# Patient Record
Sex: FEMALE | Race: BLACK OR AFRICAN AMERICAN | NOT HISPANIC OR LATINO | Employment: OTHER | RURAL
[De-identification: names, ages, dates, MRNs, and addresses within clinical notes are randomized per-mention and may not be internally consistent; named-entity substitution may affect disease eponyms.]

---

## 2017-09-18 ENCOUNTER — HISTORICAL (OUTPATIENT)
Dept: ADMINISTRATIVE | Facility: HOSPITAL | Age: 62
End: 2017-09-18

## 2017-09-20 LAB
LAB AP CLINICAL INFORMATION: NORMAL
LAB AP COMMENTS: NORMAL
LAB AP GENERAL CAT - HISTORICAL: NORMAL
LAB AP INTERPRETATION/RESULT - HISTORICAL: NEGATIVE
LAB AP SPECIMEN ADEQUACY - HISTORICAL: NORMAL
LAB AP SPECIMEN SUBMITTED - HISTORICAL: NORMAL

## 2021-11-05 ENCOUNTER — HOSPITAL ENCOUNTER (OUTPATIENT)
Dept: RADIOLOGY | Facility: HOSPITAL | Age: 66
Discharge: HOME | End: 2021-11-05
Payer: COMMERCIAL

## 2021-11-05 VITALS — BODY MASS INDEX: 32.39 KG/M2 | WEIGHT: 165 LBS | HEIGHT: 60 IN

## 2021-11-05 DIAGNOSIS — Z12.31 BREAST CANCER SCREENING BY MAMMOGRAM: ICD-10-CM

## 2021-11-05 PROCEDURE — 77067 SCR MAMMO BI INCL CAD: CPT | Mod: 26,,, | Performed by: RADIOLOGY

## 2021-11-05 PROCEDURE — 77067 MAMMO DIGITAL SCREENING BILAT: ICD-10-PCS | Mod: 26,,, | Performed by: RADIOLOGY

## 2021-11-05 PROCEDURE — 77067 SCR MAMMO BI INCL CAD: CPT | Mod: TC

## 2023-11-15 DIAGNOSIS — M10.9 GOUT FLARE: Primary | ICD-10-CM

## 2024-01-09 ENCOUNTER — OFFICE VISIT (OUTPATIENT)
Dept: FAMILY MEDICINE | Facility: CLINIC | Age: 69
End: 2024-01-09
Payer: COMMERCIAL

## 2024-01-09 VITALS
TEMPERATURE: 98 F | RESPIRATION RATE: 18 BRPM | HEART RATE: 68 BPM | DIASTOLIC BLOOD PRESSURE: 81 MMHG | WEIGHT: 154 LBS | OXYGEN SATURATION: 96 % | HEIGHT: 60 IN | BODY MASS INDEX: 30.23 KG/M2 | SYSTOLIC BLOOD PRESSURE: 130 MMHG

## 2024-01-09 DIAGNOSIS — D53.9 MACROCYTIC ANEMIA: ICD-10-CM

## 2024-01-09 DIAGNOSIS — Z12.31 ENCOUNTER FOR SCREENING MAMMOGRAM FOR MALIGNANT NEOPLASM OF BREAST: ICD-10-CM

## 2024-01-09 DIAGNOSIS — Z78.0 ASYMPTOMATIC POSTMENOPAUSAL STATE: ICD-10-CM

## 2024-01-09 DIAGNOSIS — I10 PRIMARY HYPERTENSION: ICD-10-CM

## 2024-01-09 DIAGNOSIS — F41.9 ANXIETY: ICD-10-CM

## 2024-01-09 DIAGNOSIS — M10.9 GOUT, UNSPECIFIED CAUSE, UNSPECIFIED CHRONICITY, UNSPECIFIED SITE: ICD-10-CM

## 2024-01-09 DIAGNOSIS — K21.9 GASTROESOPHAGEAL REFLUX DISEASE, UNSPECIFIED WHETHER ESOPHAGITIS PRESENT: ICD-10-CM

## 2024-01-09 DIAGNOSIS — M16.12 OSTEOARTHRITIS OF LEFT HIP, UNSPECIFIED OSTEOARTHRITIS TYPE: Primary | ICD-10-CM

## 2024-01-09 DIAGNOSIS — M25.552 LEFT HIP PAIN: ICD-10-CM

## 2024-01-09 DIAGNOSIS — E55.9 VITAMIN D DEFICIENCY: ICD-10-CM

## 2024-01-09 LAB
25(OH)D3 SERPL-MCNC: 45 NG/ML
ALBUMIN SERPL BCP-MCNC: 3.2 G/DL (ref 3.5–5)
ALBUMIN/GLOB SERPL: 0.8 {RATIO}
ALP SERPL-CCNC: 55 U/L (ref 55–142)
ALT SERPL W P-5'-P-CCNC: 14 U/L (ref 13–56)
ANION GAP SERPL CALCULATED.3IONS-SCNC: 8 MMOL/L (ref 7–16)
AST SERPL W P-5'-P-CCNC: 18 U/L (ref 15–37)
BASOPHILS # BLD AUTO: 0.02 K/UL (ref 0–0.2)
BASOPHILS NFR BLD AUTO: 0.4 % (ref 0–1)
BILIRUB SERPL-MCNC: 0.4 MG/DL (ref ?–1.2)
BUN SERPL-MCNC: 24 MG/DL (ref 7–18)
BUN/CREAT SERPL: 16 (ref 6–20)
CALCIUM SERPL-MCNC: 9.9 MG/DL (ref 8.5–10.1)
CHLORIDE SERPL-SCNC: 112 MMOL/L (ref 98–107)
CHOLEST SERPL-MCNC: 199 MG/DL (ref 0–200)
CHOLEST/HDLC SERPL: 2.8 {RATIO}
CO2 SERPL-SCNC: 26 MMOL/L (ref 21–32)
CREAT SERPL-MCNC: 1.5 MG/DL (ref 0.55–1.02)
DIFFERENTIAL METHOD BLD: ABNORMAL
EGFR (NO RACE VARIABLE) (RUSH/TITUS): 38 ML/MIN/1.73M2
EOSINOPHIL # BLD AUTO: 0.11 K/UL (ref 0–0.5)
EOSINOPHIL NFR BLD AUTO: 2 % (ref 1–4)
ERYTHROCYTE [DISTWIDTH] IN BLOOD BY AUTOMATED COUNT: 12 % (ref 11.5–14.5)
GLOBULIN SER-MCNC: 3.9 G/DL (ref 2–4)
GLUCOSE SERPL-MCNC: 86 MG/DL (ref 74–106)
HCT VFR BLD AUTO: 29.2 % (ref 38–47)
HDLC SERPL-MCNC: 71 MG/DL (ref 40–60)
HGB BLD-MCNC: 9.3 G/DL (ref 12–16)
IMM GRANULOCYTES # BLD AUTO: 0.02 K/UL (ref 0–0.04)
IMM GRANULOCYTES NFR BLD: 0.4 % (ref 0–0.4)
LDLC SERPL CALC-MCNC: 114 MG/DL
LDLC/HDLC SERPL: 1.6 {RATIO}
LYMPHOCYTES # BLD AUTO: 1.52 K/UL (ref 1–4.8)
LYMPHOCYTES NFR BLD AUTO: 27 % (ref 27–41)
MCH RBC QN AUTO: 33.2 PG (ref 27–31)
MCHC RBC AUTO-ENTMCNC: 31.8 G/DL (ref 32–36)
MCV RBC AUTO: 104.3 FL (ref 80–96)
MONOCYTES # BLD AUTO: 0.54 K/UL (ref 0–0.8)
MONOCYTES NFR BLD AUTO: 9.6 % (ref 2–6)
MPC BLD CALC-MCNC: 10 FL (ref 9.4–12.4)
NEUTROPHILS # BLD AUTO: 3.43 K/UL (ref 1.8–7.7)
NEUTROPHILS NFR BLD AUTO: 60.6 % (ref 53–65)
NONHDLC SERPL-MCNC: 128 MG/DL
NRBC # BLD AUTO: 0 X10E3/UL
NRBC, AUTO (.00): 0 %
PLATELET # BLD AUTO: 380 K/UL (ref 150–400)
POTASSIUM SERPL-SCNC: 4.4 MMOL/L (ref 3.5–5.1)
PROT SERPL-MCNC: 7.1 G/DL (ref 6.4–8.2)
RBC # BLD AUTO: 2.8 M/UL (ref 4.2–5.4)
SODIUM SERPL-SCNC: 142 MMOL/L (ref 136–145)
TRIGL SERPL-MCNC: 69 MG/DL (ref 35–150)
URATE SERPL-MCNC: 5.1 MG/DL (ref 2.6–6)
VLDLC SERPL-MCNC: 14 MG/DL
WBC # BLD AUTO: 5.64 K/UL (ref 4.5–11)

## 2024-01-09 PROCEDURE — 84550 ASSAY OF BLOOD/URIC ACID: CPT | Mod: ,,, | Performed by: CLINICAL MEDICAL LABORATORY

## 2024-01-09 PROCEDURE — 3288F FALL RISK ASSESSMENT DOCD: CPT | Mod: ,,, | Performed by: STUDENT IN AN ORGANIZED HEALTH CARE EDUCATION/TRAINING PROGRAM

## 2024-01-09 PROCEDURE — 1125F AMNT PAIN NOTED PAIN PRSNT: CPT | Mod: ,,, | Performed by: STUDENT IN AN ORGANIZED HEALTH CARE EDUCATION/TRAINING PROGRAM

## 2024-01-09 PROCEDURE — 3008F BODY MASS INDEX DOCD: CPT | Mod: ,,, | Performed by: STUDENT IN AN ORGANIZED HEALTH CARE EDUCATION/TRAINING PROGRAM

## 2024-01-09 PROCEDURE — 85025 COMPLETE CBC W/AUTO DIFF WBC: CPT | Mod: ,,, | Performed by: CLINICAL MEDICAL LABORATORY

## 2024-01-09 PROCEDURE — 1159F MED LIST DOCD IN RCRD: CPT | Mod: ,,, | Performed by: STUDENT IN AN ORGANIZED HEALTH CARE EDUCATION/TRAINING PROGRAM

## 2024-01-09 PROCEDURE — 80061 LIPID PANEL: CPT | Mod: ,,, | Performed by: CLINICAL MEDICAL LABORATORY

## 2024-01-09 PROCEDURE — 82306 VITAMIN D 25 HYDROXY: CPT | Mod: ,,, | Performed by: CLINICAL MEDICAL LABORATORY

## 2024-01-09 PROCEDURE — 3079F DIAST BP 80-89 MM HG: CPT | Mod: ,,, | Performed by: STUDENT IN AN ORGANIZED HEALTH CARE EDUCATION/TRAINING PROGRAM

## 2024-01-09 PROCEDURE — 99204 OFFICE O/P NEW MOD 45 MIN: CPT | Mod: ,,, | Performed by: STUDENT IN AN ORGANIZED HEALTH CARE EDUCATION/TRAINING PROGRAM

## 2024-01-09 PROCEDURE — 3075F SYST BP GE 130 - 139MM HG: CPT | Mod: ,,, | Performed by: STUDENT IN AN ORGANIZED HEALTH CARE EDUCATION/TRAINING PROGRAM

## 2024-01-09 PROCEDURE — 80053 COMPREHEN METABOLIC PANEL: CPT | Mod: ,,, | Performed by: CLINICAL MEDICAL LABORATORY

## 2024-01-09 PROCEDURE — 1101F PT FALLS ASSESS-DOCD LE1/YR: CPT | Mod: ,,, | Performed by: STUDENT IN AN ORGANIZED HEALTH CARE EDUCATION/TRAINING PROGRAM

## 2024-01-09 RX ORDER — FLUOXETINE HYDROCHLORIDE 20 MG/1
20 CAPSULE ORAL 2 TIMES DAILY
COMMUNITY
Start: 2023-07-25

## 2024-01-09 RX ORDER — POTASSIUM CHLORIDE 750 MG/1
10 TABLET, EXTENDED RELEASE ORAL
COMMUNITY
Start: 2023-12-12

## 2024-01-09 RX ORDER — ATENOLOL AND CHLORTHALIDONE TABLET 50; 25 MG/1; MG/1
1 TABLET ORAL
COMMUNITY
Start: 2023-11-27

## 2024-01-09 RX ORDER — CHOLECALCIFEROL (VITAMIN D3) 25 MCG
1000 TABLET ORAL DAILY
Qty: 90 TABLET | Refills: 1 | Status: SHIPPED | OUTPATIENT
Start: 2024-01-09 | End: 2025-01-08

## 2024-01-09 RX ORDER — ERGOCALCIFEROL 1.25 MG/1
50000 CAPSULE ORAL
COMMUNITY
Start: 2023-09-27

## 2024-01-09 RX ORDER — DICLOFENAC SODIUM 75 MG/1
75 TABLET, DELAYED RELEASE ORAL 2 TIMES DAILY PRN
COMMUNITY
Start: 2023-10-24

## 2024-01-09 RX ORDER — COLCHICINE 0.6 MG/1
TABLET ORAL
COMMUNITY
Start: 2023-10-31 | End: 2024-01-09

## 2024-01-09 NOTE — LETTER
January 9, 2024      [facility] [address] [place] [address]  Phone: [phone]  Fax: [phone]       Patient: Radha Waldrop   YOB: 1955  Date of Visit: 01/09/2024    To Whom It May Concern:    Lobo Waldrop  was at [facility] on 01/09/2024. The patient may return to work/school on 1/11/2024 with no restrictions. If you have any questions or concerns, or if I can be of further assistance, please do not hesitate to contact me.    Sincerely,    Nancy Ken

## 2024-01-09 NOTE — PROGRESS NOTES
Progress Note     KRISTIN SHAFFER MD   [address]  [place] [address]     PATIENT NAME: Radha Waldrop  : 1955  DATE: 24  MRN: [id number]      Billing Provider: KRISTIN SHAFFER MD  Level of Service:   Patient PCP Information       Provider PCP Type    Harris Marc MD General                Hip Pain (Pt stated she been having problems with her hip for awhile now/L hip pain/Pt rates her pain a 8 while in clinic /Pt stated she had a xray on it 2-3 years ago), Establish Care, and Health Maintenance (Pt is due for flu vaccine but stated she will get one once she get to feeling better/Pt stated she had the covid vaccine at [facility] in [place]/Pt declined the shingles vaccine/Pt wants to be schedule for a mammogram /Pt wants to be schedule for a dexa scan /Pt stated she a A1c checked 5-6 mths ago /)      SUBJECTIVE:     Radha Waldrop is a 68 y.o.female who presents to clinic for Hip Pain (Pt stated she been having problems with her hip for awhile now/L hip pain/Pt rates her pain a 8 while in clinic /Pt stated she had a xray on it 2-3 years ago), Establish Care, and Health Maintenance (Pt is due for flu vaccine but stated she will get one once she get to feeling better/Pt stated she had the covid vaccine at [facility] in [place]/Pt declined the shingles vaccine/Pt wants to be schedule for a mammogram /Pt wants to be schedule for a dexa scan /Pt stated she a A1c checked 5-6 mths ago /)    Patient presents to clinic today for establishment of care and evaluation of left hip pain.    Patient notes onset of left hip pain approximately 2-3 months ago.  Patient previously had left hip pain 2-3 years ago which was mild in nature and improved.  Patient had an x-ray at that time which showed some osteoarthritis.  Patient states that 2-3 months ago the pain presented and has since worsened.  She notes that at times it does feel a little bit better but then it returns.  She notes  worsening of pain with ambulation.  She notes improvement with rest and lying down.  Patient denies any known injuries or falls.  Patient has been taking Tylenol, diclofenac, and tramadol with some relief.  Patient has not been evaluated by orthopedics for this pain.  Patient's last imaging was 2-3 years ago.  She is currently having to walk with a cane when her hip is causing pain.    Patient previously evaluated at [facility] and Dr. Marc.  Records have been requested.    Patient has a history of gout per chart review.  She has been prescribed colchicine by [facility] in the past.  She notes that she was never had warmth, swelling, significant single joint pain in the past.  She reports history of diffuse joint pain.  Patient has been prescribed colchicine in the past but only for a single course.  She states she has not taken that in many months now.    Patient has a history of hypertension for which she takes atenolol/chlorthalidone.  She has no difficulty tolerating these medications.  Patient also takes potassium.    Patient has anxiety for which she takes Prozac.  She notes that she is not having any breakthrough symptoms.  She tolerates the medication without difficulty.    She has a history of vitamin-D deficiency and was previously on ergocalciferol but has not taken that in some months.  She has not currently taking any vitamin-D.  Has not had vitamin-D level checked in some time per her report.    Patient also has a history of GERD.  She does take an acid reflux medication but is unsure which medication she takes.  She notes symptoms are well-controlled with this regimen.    Mammogram 2 years ago. It was normal. No history of abnoraml mammograms.     Dexa scan. Last bone density 5-6 years ago. It was noraml.     Patient has a colonoscopy at [place] proximally 3 years ago and it was normal per her report.  She is not sure when she was supposed to repeat.    Still has uterus and ovaries. Was getting  regular pap smears up until age 64.  Last Pap smear was normal per review.  All future Pap smears.      All other pertinent review of systems negative. Please see HPI for details.     Past Medical History:  has a past medical history of Essential (primary) hypertension.   Past Surgical History:  has a past surgical history that includes Knee surgery (Right).  Family History: family history includes Diabetes in her mother; Lung cancer in her father.  Social History:  reports that she has never smoked. She has never used smokeless tobacco. She reports that she does not drink alcohol and does not use drugs.  Allergies:   Review of patient's allergies indicates:   Allergen Reactions    Ace inhibitors Swelling         Current Outpatient Medications:     atenoloL-chlorthalidone (TENORETIC) 50-25 mg Tab, Take 1 tablet by mouth., Disp: , Rfl:     diclofenac (VOLTAREN) 75 MG EC tablet, Take 75 mg by mouth 2 (two) times daily as needed., Disp: , Rfl:     ergocalciferol (ERGOCALCIFEROL) 50,000 unit Cap, Take 50,000 Units by mouth every 7 days., Disp: , Rfl:     FLUoxetine 20 MG capsule, Take 20 mg by mouth 2 (two) times daily., Disp: , Rfl:     potassium chloride (KLOR-CON) 10 MEQ TbSR, Take 10 mEq by mouth., Disp: , Rfl:     vitamin D (VITAMIN D3) 1000 units Tab, Take 1 tablet (1,000 Units total) by mouth once daily., Disp: 90 tablet, Rfl: 1   OBJECTIVE:     Vital Signs   /81 (BP Location: Left arm, Patient Position: Sitting, BP Method: Large (Manual))   Pulse 68   Temp 97.6 °F (36.4 °C) (Temporal)   Resp 18   Ht 5' (1.524 m)   Wt 69.9 kg (154 lb)   SpO2 96%   BMI 30.08 kg/m²     Physical Exam  Constitutional:       General: She is not in acute distress.     Appearance: Normal appearance. She is not ill-appearing, toxic-appearing or diaphoretic.   HENT:      Head: Normocephalic and atraumatic.   Eyes:      Extraocular Movements: Extraocular movements intact.      Pupils: Pupils are equal, round, and reactive to  light.   Cardiovascular:      Rate and Rhythm: Normal rate and regular rhythm.      Pulses: Normal pulses.      Heart sounds: Normal heart sounds. No murmur heard.     No friction rub. No gallop.   Pulmonary:      Effort: Pulmonary effort is normal. No respiratory distress.      Breath sounds: No wheezing, rhonchi or rales.   Abdominal:      General: Abdomen is flat.      Palpations: Abdomen is soft.      Tenderness: There is no abdominal tenderness. There is no guarding or rebound.   Musculoskeletal:         General: Normal range of motion.      Cervical back: Normal range of motion.      Comments: Positive PK and FADIR in left hip.  Decreased range of motion in left hip particularly in regards to internal rotation.  Negative on right.  No tenderness to palpation over lumbar spine or bilateral SI joints.   Skin:     General: Skin is warm and dry.      Capillary Refill: Capillary refill takes less than 2 seconds.   Neurological:      General: No focal deficit present.      Mental Status: She is alert.   Psychiatric:         Mood and Affect: Mood normal.         Behavior: Behavior normal.         ASSESSMENT/PLAN:     1. Osteoarthritis of left hip, unspecified osteoarthritis type  -     Ambulatory referral/consult to Orthopedics; Future; Expected date: 01/16/2024    2. Left hip pain  -     X-Ray Hip 2 or 3 views Left (with Pelvis when performed); Future; Expected date: 01/09/2024  -     Ambulatory referral/consult to Orthopedics; Future; Expected date: 01/16/2024    Patient with positive PK and FADIR.  X-ray of left hip obtained and shows severe osteoarthritis.  Patient may continue current medication regimen.  We will refer to orthopedics for further evaluation and treatment.  Appreciate assistance.    3. Primary hypertension  -     Lipid Panel; Future; Expected date: 01/09/2024  -     Comprehensive Metabolic Panel; Future; Expected date: 01/09/2024  -     CBC Auto Differential; Future; Expected date:  01/09/2024    Patient with hypertension currently well-controlled with atenolol/chlorthalidone.  Continue current regimen.  We will obtain lipid panel, CMP, and CBC for routine monitoring.    4. Gout, unspecified cause, unspecified chronicity, unspecified site  -     Uric Acid; Future; Expected date: 01/09/2024    Patient with questionable history of gout.  We will obtain uric acid level.    5. Anxiety    Patient with anxiety and is currently well-controlled with Prozac.  Continue Prozac.    6. Vitamin D deficiency  -     Vitamin D; Future; Expected date: 01/09/2024    Patient with a history of vitamin-D deficiency for which he was previously prescribed ergocalciferol.  We will prescribe daily cholecalciferol.  Pending vitamin-D results, patient may benefit from additional course of ergocalciferol times 12 weeks.    7. Gastroesophageal reflux disease, unspecified whether esophagitis present    Patient with a history of GERD for which she is taking an unknown antacid.    8. Asymptomatic postmenopausal state  -     DXA Bone Density Axial Skeleton 1 or more sites; Future; Expected date: 01/09/2024    DEXA scan ordered.    9. Encounter for screening mammogram for malignant neoplasm of breast  -     Mammo Digital Screening Bilat; Future; Expected date: 01/09/2024    Mammogram ordered.    Other orders  -     vitamin D (VITAMIN D3) 1000 units Tab; Take 1 tablet (1,000 Units total) by mouth once daily.  Dispense: 90 tablet; Refill: 1        No follow-ups on file.      KRISTIN SHAFFER MD  01/09/2024    Due to voice recognition software, sound alike and misspelled words may be contained in the documentation.

## 2024-01-10 ENCOUNTER — PATIENT OUTREACH (OUTPATIENT)
Dept: ADMINISTRATIVE | Facility: HOSPITAL | Age: 69
End: 2024-01-10

## 2024-01-10 DIAGNOSIS — D53.9 MACROCYTIC ANEMIA: Primary | ICD-10-CM

## 2024-01-10 LAB
FERRITIN SERPL-MCNC: 164 NG/ML (ref 8–252)
FOLATE SERPL-MCNC: 7 NG/ML (ref 3.1–17.5)
IRON SATN MFR SERPL: 31 % (ref 14–50)
IRON SERPL-MCNC: 83 ΜG/DL (ref 50–170)
TIBC SERPL-MCNC: 264 ΜG/DL (ref 250–450)
VIT B12 SERPL-MCNC: 673 PG/ML (ref 193–986)

## 2024-01-10 PROCEDURE — 82728 ASSAY OF FERRITIN: CPT | Mod: ,,, | Performed by: CLINICAL MEDICAL LABORATORY

## 2024-01-10 PROCEDURE — 83550 IRON BINDING TEST: CPT | Mod: ,,, | Performed by: CLINICAL MEDICAL LABORATORY

## 2024-01-10 PROCEDURE — 82746 ASSAY OF FOLIC ACID SERUM: CPT | Mod: ,,, | Performed by: CLINICAL MEDICAL LABORATORY

## 2024-01-10 PROCEDURE — 83540 ASSAY OF IRON: CPT | Mod: ,,, | Performed by: CLINICAL MEDICAL LABORATORY

## 2024-01-10 PROCEDURE — 82607 VITAMIN B-12: CPT | Mod: ,,, | Performed by: CLINICAL MEDICAL LABORATORY

## 2024-01-10 NOTE — PROGRESS NOTES
Please let patient know that her cholesterol is well-controlled.  Patient's liver enzymes and electrolytes are normal.  She does have elevated creatinine.  She will need make sure she is drinking plenty of water and avoiding NSAIDs such as Aleve and Motrin and ibuprofen.  She may take Tylenol for pain control.  She will need to stop her Voltaren tablets.  We will need to recheck this level in 3 months to ensure it is improving.  Patient also has some anemia.  If amenable, I would like to add on additional blood work for further evaluation.

## 2024-01-10 NOTE — LETTER
AUTHORIZATION FOR RELEASE OF   CONFIDENTIAL INFORMATION    Dear [facility],    We are seeing aRdha Waldrop, date of birth 1955, in the clinic at [facility]. Mireya Barnes MD is the patient's PCP. Radha Waldrop has an outstanding lab/procedure at the time we reviewed her chart. In order to help keep her health information updated, she has authorized us to request the following medical record(s):        (  )  MAMMOGRAM                                      (X)  COLONOSCOPY      (  )  PAP SMEAR                                          (  )  OUTSIDE LAB RESULTS     (  )  DEXA SCAN                                          (  )  EYE EXAM            (  )  FOOT EXAM                                          (  )  ENTIRE RECORD     (  )  OUTSIDE IMMUNIZATIONS                 (  )  _______________         Please fax records to [facility], Geena Presley, [phone].     If you have any questions, please contact [phone].          Patient Name: Radha Waldrop  : 1955  Patient Phone #: [phone]

## 2024-01-11 NOTE — PROGRESS NOTES
Please let patient know that she has no signs of iron-deficiency anemia or vitamin B12 deficiency associated anemia.  Please ensure patient has no signs of active bleeding.  We will need to recheck this blood count in 1 month to ensure it is trending up.  If it is not, we will need to perform additional workup.  Please have patient follow up in 1 month.

## 2024-01-12 DIAGNOSIS — N28.9 DISORDER OF KIDNEY AND URETER: Primary | ICD-10-CM

## 2024-01-17 ENCOUNTER — PATIENT OUTREACH (OUTPATIENT)
Dept: ADMINISTRATIVE | Facility: HOSPITAL | Age: 69
End: 2024-01-17

## 2024-01-17 NOTE — PROGRESS NOTES
Requested colonoscopy from [facility] , and no records was found. Will let Cameron know that pt needs a referral to have one completed to be compliant.

## 2024-01-19 ENCOUNTER — TELEPHONE (OUTPATIENT)
Dept: NEPHROLOGY | Facility: CLINIC | Age: 69
End: 2024-01-19
Payer: COMMERCIAL

## 2024-01-19 NOTE — TELEPHONE ENCOUNTER
----- Message from Dina Bean sent at 1/19/2024  9:42 AM CST -----  Regarding: Referral  Oswaldo Palomino NP refer pt for  N28.9 (ICD-10-CM) - Disorder of kidney and ureter. Please call her @ [phone]

## 2024-01-22 ENCOUNTER — OFFICE VISIT (OUTPATIENT)
Dept: ORTHOPEDICS | Facility: CLINIC | Age: 69
End: 2024-01-22
Payer: COMMERCIAL

## 2024-01-22 DIAGNOSIS — M16.12 OSTEOARTHRITIS OF LEFT HIP, UNSPECIFIED OSTEOARTHRITIS TYPE: Primary | ICD-10-CM

## 2024-01-22 DIAGNOSIS — M25.552 LEFT HIP PAIN: ICD-10-CM

## 2024-01-22 PROCEDURE — 99204 OFFICE O/P NEW MOD 45 MIN: CPT | Mod: S$PBB,,, | Performed by: ORTHOPAEDIC SURGERY

## 2024-01-22 PROCEDURE — 1160F RVW MEDS BY RX/DR IN RCRD: CPT | Mod: CPTII,,, | Performed by: ORTHOPAEDIC SURGERY

## 2024-01-22 PROCEDURE — 1159F MED LIST DOCD IN RCRD: CPT | Mod: CPTII,,, | Performed by: ORTHOPAEDIC SURGERY

## 2024-01-22 PROCEDURE — 99213 OFFICE O/P EST LOW 20 MIN: CPT | Mod: PBBFAC | Performed by: ORTHOPAEDIC SURGERY

## 2024-01-22 RX ORDER — MELOXICAM 15 MG/1
15 TABLET ORAL DAILY
Qty: 30 TABLET | Refills: 2 | Status: ON HOLD | OUTPATIENT
Start: 2024-01-22 | End: 2024-03-15 | Stop reason: HOSPADM

## 2024-01-22 NOTE — PROGRESS NOTES
CLINIC NOTE       Chief Complaint   Patient presents with    Left Hip - Pain        Radha Waldrop is a 68 y.o. female seen today for evaluation of left hip pain.  Symptoms began insidiously been escalating over the past 3-4 months period of time.  She has been experiencing groin and thigh pain worse with weight-bearing.  She was taken Tylenol for pain control.  She was not on any anti-inflammatory medications nor does she take any blood thinners.  She has not had a corticosteroid injection in her hip.  She has undergone right TKR performed elsewhere.  She lives alone and [place].  Her  is .    X-rays of the pelvis and both hips 2022 (outside films) two views AP and lateral projection were reviewed.  There is severe DJD of the left hip joint with obliteration of the joint space and marginal osteophyte formation.  There is moderate narrowing of the right hip joint space.  Past Medical History:   Diagnosis Date    Essential (primary) hypertension      Family History   Problem Relation Age of Onset    Diabetes Mother     Lung cancer Father      Current Outpatient Medications on File Prior to Visit   Medication Sig Dispense Refill    atenoloL-chlorthalidone (TENORETIC) 50-25 mg Tab Take 1 tablet by mouth.      diclofenac (VOLTAREN) 75 MG EC tablet Take 75 mg by mouth 2 (two) times daily as needed.      ergocalciferol (ERGOCALCIFEROL) 50,000 unit Cap Take 50,000 Units by mouth every 7 days.      FLUoxetine 20 MG capsule Take 20 mg by mouth 2 (two) times daily.      potassium chloride (KLOR-CON) 10 MEQ TbSR Take 10 mEq by mouth.      vitamin D (VITAMIN D3) 1000 units Tab Take 1 tablet (1,000 Units total) by mouth once daily. 90 tablet 1     No current facility-administered medications on file prior to visit.       ROS     There were no vitals filed for this visit.    Past Surgical History:   Procedure Laterality Date    KNEE SURGERY Right         Review of patient's allergies  indicates:   Allergen Reactions    Ace inhibitors Swelling        Ortho Exam :  Well-developed well-nourished black female no acute distress.  She is alert oriented cooperative.  She was presented wheelchair but is able to ambulate.  She does reportedly have a significant limp and her symptoms disrupt desired activities of daily living.  There is component of night pain.  There is restriction of joint motion in all planes.  There is a lack of internal rotation beyond the neutral position.  Hip flexion and internal rotation causes significant pain.    Radiographic Examination:    Technique:    Findings:    Impression:   See Above    Assessment and Plan  Patient Active Problem List    Diagnosis Date Noted    Primary hypertension 01/09/2024    Gout 01/09/2024    Anxiety 01/09/2024    Vitamin D deficiency 01/09/2024    Gastroesophageal reflux disease 01/09/2024    Impression:  Severe DJD left hip   Plan:  We discussed conservative and surgical management options.  She was offered Mobic 15 mg 1 p.o. q.d. to be used on a p.r.n. basis.  She can consider intra-articular steroid injection left [facility] at [facility] but we will need to wait 3-4 months after injection before pursuing THR.  We discussed Arnold robotic assisted left THR with overnight stay in the hospital.  Potential benefits risks surgery outlined to include but not limited to bleeding, infection, damage to blood vessels nerves, need for further surgery, other risks complications including even death.  She was given numbers to call the office if she does not pursue cortisone injection and wished to proceed.  Rx M[facility] and      Apolinar Blanco M.D.

## 2024-01-26 ENCOUNTER — TELEPHONE (OUTPATIENT)
Dept: ORTHOPEDICS | Facility: CLINIC | Age: 69
End: 2024-01-26
Payer: COMMERCIAL

## 2024-02-01 ENCOUNTER — TELEPHONE (OUTPATIENT)
Dept: ORTHOPEDICS | Facility: CLINIC | Age: 69
End: 2024-02-01
Payer: COMMERCIAL

## 2024-02-01 NOTE — TELEPHONE ENCOUNTER
----- Message from Kristina Mills sent at 2/1/2024 11:43 AM CST -----  Pt f/u from message she left on 1/26 regarding questions about surgery that was discussed at recent visit - pt call back # [phone]

## 2024-02-09 DIAGNOSIS — Z71.89 COMPLEX CARE COORDINATION: ICD-10-CM

## 2024-02-13 DIAGNOSIS — Z01.818 PREOP EXAMINATION: ICD-10-CM

## 2024-02-13 DIAGNOSIS — Z01.812 PRE-PROCEDURE LAB EXAM: ICD-10-CM

## 2024-02-13 DIAGNOSIS — Z01.810 PREOP CARDIOVASCULAR EXAM: ICD-10-CM

## 2024-02-13 DIAGNOSIS — Z01.811 PREOP RESPIRATORY EXAM: ICD-10-CM

## 2024-02-13 DIAGNOSIS — M16.12 OSTEOARTHRITIS OF LEFT HIP, UNSPECIFIED OSTEOARTHRITIS TYPE: Primary | ICD-10-CM

## 2024-02-21 ENCOUNTER — TELEPHONE (OUTPATIENT)
Dept: ORTHOPEDICS | Facility: CLINIC | Age: 69
End: 2024-02-21
Payer: COMMERCIAL

## 2024-02-21 NOTE — TELEPHONE ENCOUNTER
----- Message from Dipika Richardson sent at 2/21/2024  9:55 AM CST -----  Regarding: JOINT CLASS  PATIENT WOULD LIKE TO SPEAK WITH NURSE ABOUT THE JOINT CLASS SHE IS SCHEDULED FOR TODAY.  CALL BACK NUMBER IS [phone].

## 2024-02-21 NOTE — TELEPHONE ENCOUNTER
2/21/24 @ 1011 called and spoke with pt. Pt states she will not be able to make it to joint class today. Next joint class is march 13 pt will try and attend that one. Number given to  for pt to talk to them about what she might need after her hip surgery

## 2024-02-26 NOTE — PROGRESS NOTES
Progress Note     KRISTIN SHAFFER MD   [address]  [place] [address]     PATIENT NAME: Radha Waldrop  : 1955  DATE: 24  MRN: [id number]      Billing Provider: KRISTIN SHAFFER MD  Level of Service:   Patient PCP Information       Provider PCP Type    KRISTIN SHAFFER MD General                Follow-up (Patient is here today for follow up for surgery that's coming up.) and Health Maintenance (Patient is interested in getting the flu vaccine and pheneumoccal vaccine. Pateint declined all other care gaps.)      SUBJECTIVE:     Radha Waldrop is a 68 y.o.female who presents to clinic for Follow-up (Patient is here today for follow up for surgery that's coming up.) and Health Maintenance (Patient is interested in getting the flu vaccine and pheneumoccal vaccine. Pateint declined all other care gaps.)    Patient presents to clinic today for preop exam.    Patient was scheduled to have hip replacement secondary to severe osteoarthritis.  Patient has had anesthesia in the past without adverse side effects.  Patient denies any baseline chest pain, shortness for breath, or lightheadedness/dizziness with activity.  Patient states prior to onset of her significant hip pain she was able to go to the gym daily and exercise consistently without shortness a breath or chest pain.  Patient is able to walk up a flight of stairs without shortness a breath.  Patient without any known history of heart disease.  Patient has been no history of diabetes, lung disease, or tobacco use.  Patient does have hypertension but blood pressure.  Blood pressure was initially elevated but improved with repeat.    Patient does have longstanding history of anemia and did require a blood transfusion following knee replacement some years ago.  Patient notes significant family history of anemia as well.  Patient states that she was had anemia since she was a child.  Recent workup did not show any signs  of iron or vitamin B12/folate deficiency.  Patient reports normal colonoscopy 3-4 years ago at [facility].  Records were requested at her establishment of care appointment in January.    Patient is currently taking Mobic for pain control as well as aspirin for primary prevention.  Patient understands she was supposed to hold Mobic at least two weeks prior to surgery as well as her aspirin.    Patient is also requesting medication refills.  Patient has a history of vitamin-D deficiency.  She tolerates vitamin-D without difficulty.  Patient has a history of hypertension for which he takes atenolol/chlorthalidone.  Patient also has a history of anxiety for which she takes Prozac, topiramate, and trazodone to assist with sleep.  She tolerates these medications without difficulty and notes her symptoms are well-controlled.  Patient also has a history of GERD for which he takes Prilosec.  Patient denies any breakthrough symptoms when taking this medication appropriately.    All other pertinent review of systems negative. Please see HPI for details.     Past Medical History:  has a past medical history of Essential (primary) hypertension.   Past Surgical History:  has a past surgical history that includes Knee surgery (Right).  Family History: family history includes Diabetes in her mother; Lung cancer in her father.  Social History:  reports that she has never smoked. She has never been exposed to tobacco smoke. She has never used smokeless tobacco. She reports that she does not drink alcohol and does not use drugs.  Allergies:   Review of patient's allergies indicates:   Allergen Reactions    Ace inhibitors Swelling         Current Outpatient Medications:     acetaminophen (TYLENOL) 650 MG TbSR, Take 650 mg by mouth every 8 (eight) hours., Disp: , Rfl:     aspirin (ECOTRIN) 81 MG EC tablet, Take 81 mg by mouth once daily., Disp: , Rfl:     meloxicam (MOBIC) 15 MG tablet, Take 1 tablet (15 mg total) by mouth once daily.,  Disp: 30 tablet, Rfl: 2    atenoloL-chlorthalidone (TENORETIC) 50-25 mg Tab, Take 1 tablet by mouth once daily., Disp: 90 tablet, Rfl: 1    FLUoxetine 20 MG capsule, Take 1 capsule (20 mg total) by mouth 2 (two) times daily., Disp: 180 capsule, Rfl: 1    omeprazole (PRILOSEC) 20 MG capsule, Take 1 capsule (20 mg total) by mouth once daily., Disp: 90 capsule, Rfl: 1    potassium chloride (KLOR-CON) 10 MEQ TbSR, Take 1 tablet (10 mEq total) by mouth once daily., Disp: 90 tablet, Rfl: 1    topiramate (TOPAMAX) 25 MG tablet, Take 1 tablet (25 mg total) by mouth 2 (two) times daily., Disp: 180 tablet, Rfl: 1    traZODone (DESYREL) 50 MG tablet, Take 1 tablet (50 mg total) by mouth every evening., Disp: 90 tablet, Rfl: 1    vitamin D (VITAMIN D3) 1000 units Tab, Take 1 tablet (1,000 Units total) by mouth once daily., Disp: 90 tablet, Rfl: 1   OBJECTIVE:     Vital Signs   /84 Comment: Manual on 3/1  Pulse 60   Temp 97.6 °F (36.4 °C) (Temporal)   Resp 19   Ht 5' (1.524 m)   Wt 70.8 kg (156 lb)   SpO2 99%   BMI 30.47 kg/m²     Physical Exam  Constitutional:       General: She is not in acute distress.     Appearance: Normal appearance. She is not ill-appearing, toxic-appearing or diaphoretic.   HENT:      Head: Normocephalic and atraumatic.      Right Ear: Tympanic membrane normal.      Left Ear: Tympanic membrane normal.      Nose: No congestion or rhinorrhea.      Mouth/Throat:      Mouth: Mucous membranes are moist.      Pharynx: No oropharyngeal exudate or posterior oropharyngeal erythema.   Eyes:      Extraocular Movements: Extraocular movements intact.      Pupils: Pupils are equal, round, and reactive to light.   Cardiovascular:      Rate and Rhythm: Normal rate and regular rhythm.      Pulses: Normal pulses.      Heart sounds: Normal heart sounds. No murmur heard.     No friction rub. No gallop.   Pulmonary:      Effort: Pulmonary effort is normal. No respiratory distress.      Breath sounds: No  wheezing, rhonchi or rales.   Abdominal:      General: Abdomen is flat.      Palpations: Abdomen is soft.      Tenderness: There is no abdominal tenderness. There is no guarding or rebound.   Musculoskeletal:         General: Normal range of motion.      Cervical back: Normal range of motion and neck supple. No rigidity or tenderness.      Right lower leg: No edema.      Left lower leg: No edema.   Lymphadenopathy:      Cervical: No cervical adenopathy.   Skin:     General: Skin is warm and dry.      Capillary Refill: Capillary refill takes less than 2 seconds.   Neurological:      General: No focal deficit present.      Mental Status: She is alert.   Psychiatric:         Mood and Affect: Mood normal.         Behavior: Behavior normal.     ECG: Sinus rhythm, intraatrial conduction delay    ASSESSMENT/PLAN:     1. Preop examination  -     IN OFFICE EKG 12-LEAD (to [facility])  -     Cancel: CBC Auto Differential; Future; Expected date: 02/27/2024  -     Cancel: Basic Metabolic Panel; Future; Expected date: 02/27/2024  -     POCT URINALYSIS W/O SCOPE  -     Cancel: Protime-INR; Future; Expected date: 02/27/2024  -     Cancel: APTT; Future; Expected date: 02/27/2024  -     X-Ray Chest PA And Lateral; Future; Expected date: 02/27/2024  -     APTT  -     Basic Metabolic Panel  -     CBC Auto Differential  -     Protime-INR  -     Urinalysis  -     Type & Screen  -     CBC Morphology    2. Pre-procedure lab exam  -     APTT  -     Basic Metabolic Panel  -     CBC Auto Differential  -     Protime-INR  -     Urinalysis  -     Type & Screen  -     CBC Morphology    Patient with met score of  >4.  ECG and chest x-ray without signs of ischemic heart disease or congestive heart failure.  Renal function improved from previous.  Patient with baseline anemia but improved from previous.  Patient's anemia is chronic since childhood.  Patient has required blood following procedures in the past.  Type and screen was obtained.  Patient's  revised cardiac risk index score 0.  Patient has a class 1 risk with a 3.9% chance of 30 day risk of death, MI, or cardiac arrest following surgery.  Discussed risks and benefits of surgery from a medical standpoint.  Patient verbalized understanding.  Patient to hold Mobic and aspirin.  Patient cleared for surgery from medical standpoint.    3. Vitamin D deficiency  -     vitamin D (VITAMIN D3) 1000 units Tab; Take 1 tablet (1,000 Units total) by mouth once daily.  Dispense: 90 tablet; Refill: 1  Refill provided.    4. Anxiety  -     FLUoxetine 20 MG capsule; Take 1 capsule (20 mg total) by mouth 2 (two) times daily.  Dispense: 180 capsule; Refill: 1  -     topiramate (TOPAMAX) 25 MG tablet; Take 1 tablet (25 mg total) by mouth 2 (two) times daily.  Dispense: 180 tablet; Refill: 1  -     traZODone (DESYREL) 50 MG tablet; Take 1 tablet (50 mg total) by mouth every evening.  Dispense: 90 tablet; Refill: 1  Currently well-controlled.  Refill provided per patient request.    5. Gastroesophageal reflux disease, unspecified whether esophagitis present  -     omeprazole (PRILOSEC) 20 MG capsule; Take 1 capsule (20 mg total) by mouth once daily.  Dispense: 90 capsule; Refill: 1  Symptoms well-controlled.  Refill provided.    6. Primary hypertension  -     atenoloL-chlorthalidone (TENORETIC) 50-25 mg Tab; Take 1 tablet by mouth once daily.  Dispense: 90 tablet; Refill: 1  -     potassium chloride (KLOR-CON) 10 MEQ TbSR; Take 1 tablet (10 mEq total) by mouth once daily.  Dispense: 90 tablet; Refill: 1  Blood pressure well-controlled.  Continue current regimen.  Refill provided.      Follow up in about 3 months (around 5/27/2024) for Recheck .      KRISTIN SHAFFER MD  03/07/2024    Due to voice recognition software, sound alike and misspelled words may be contained in the documentation.

## 2024-02-27 ENCOUNTER — OFFICE VISIT (OUTPATIENT)
Dept: FAMILY MEDICINE | Facility: CLINIC | Age: 69
End: 2024-02-27
Payer: COMMERCIAL

## 2024-02-27 DIAGNOSIS — F41.9 ANXIETY: ICD-10-CM

## 2024-02-27 DIAGNOSIS — Z01.812 PRE-PROCEDURE LAB EXAM: ICD-10-CM

## 2024-02-27 DIAGNOSIS — Z01.818 PREOP EXAMINATION: Primary | ICD-10-CM

## 2024-02-27 DIAGNOSIS — E55.9 VITAMIN D DEFICIENCY: ICD-10-CM

## 2024-02-27 DIAGNOSIS — K21.9 GASTROESOPHAGEAL REFLUX DISEASE, UNSPECIFIED WHETHER ESOPHAGITIS PRESENT: ICD-10-CM

## 2024-02-27 DIAGNOSIS — I10 PRIMARY HYPERTENSION: ICD-10-CM

## 2024-02-27 LAB
ANION GAP SERPL CALCULATED.3IONS-SCNC: 10 MMOL/L (ref 7–16)
APTT PPP: 33.2 SECONDS (ref 25.2–37.3)
BASOPHILS # BLD AUTO: 0.02 K/UL (ref 0–0.2)
BASOPHILS NFR BLD AUTO: 0.4 % (ref 0–1)
BILIRUB SERPL-MCNC: NEGATIVE MG/DL
BILIRUB UR QL STRIP: NEGATIVE
BLOOD URINE, POC: NEGATIVE
BUN SERPL-MCNC: 30 MG/DL (ref 7–18)
BUN/CREAT SERPL: 24 (ref 6–20)
CALCIUM SERPL-MCNC: 9.5 MG/DL (ref 8.5–10.1)
CHLORIDE SERPL-SCNC: 110 MMOL/L (ref 98–107)
CLARITY UR: CLEAR
CO2 SERPL-SCNC: 24 MMOL/L (ref 21–32)
COLOR UR: NORMAL
COLOR, POC UA: YELLOW
CREAT SERPL-MCNC: 1.23 MG/DL (ref 0.55–1.02)
DIFFERENTIAL METHOD BLD: ABNORMAL
EGFR (NO RACE VARIABLE) (RUSH/TITUS): 48 ML/MIN/1.73M2
EOSINOPHIL # BLD AUTO: 0.21 K/UL (ref 0–0.5)
EOSINOPHIL NFR BLD AUTO: 4.4 % (ref 1–4)
ERYTHROCYTE [DISTWIDTH] IN BLOOD BY AUTOMATED COUNT: 11.8 % (ref 11.5–14.5)
GLUCOSE SERPL-MCNC: 75 MG/DL (ref 74–106)
GLUCOSE UR QL STRIP: NEGATIVE
GLUCOSE UR STRIP-MCNC: NORMAL MG/DL
HCT VFR BLD AUTO: 33.4 % (ref 38–47)
HGB BLD-MCNC: 10.7 G/DL (ref 12–16)
IMM GRANULOCYTES # BLD AUTO: 0.01 K/UL (ref 0–0.04)
IMM GRANULOCYTES NFR BLD: 0.2 % (ref 0–0.4)
INR BLD: 1.13
KETONES UR QL STRIP: NEGATIVE
KETONES UR STRIP-SCNC: NEGATIVE MG/DL
LEUKOCYTE ESTERASE UR QL STRIP: NEGATIVE
LEUKOCYTE ESTERASE URINE, POC: NEGATIVE
LYMPHOCYTES # BLD AUTO: 1.31 K/UL (ref 1–4.8)
LYMPHOCYTES NFR BLD AUTO: 27.7 % (ref 27–41)
MACROCYTES BLD QL SMEAR: NORMAL
MCH RBC QN AUTO: 33.8 PG (ref 27–31)
MCHC RBC AUTO-ENTMCNC: 32 G/DL (ref 32–36)
MCV RBC AUTO: 105.4 FL (ref 80–96)
MONOCYTES # BLD AUTO: 0.4 K/UL (ref 0–0.8)
MONOCYTES NFR BLD AUTO: 8.5 % (ref 2–6)
MPC BLD CALC-MCNC: 10.3 FL (ref 9.4–12.4)
NEUTROPHILS # BLD AUTO: 2.78 K/UL (ref 1.8–7.7)
NEUTROPHILS NFR BLD AUTO: 58.8 % (ref 53–65)
NITRITE UR QL STRIP: NEGATIVE
NITRITE, POC UA: NEGATIVE
NRBC # BLD AUTO: 0 X10E3/UL
NRBC, AUTO (.00): 0 %
PH UR STRIP: 5.5 PH UNITS
PH, POC UA: 6
PLATELET # BLD AUTO: 291 K/UL (ref 150–400)
PLATELET MORPHOLOGY: NORMAL
POTASSIUM SERPL-SCNC: 3.8 MMOL/L (ref 3.5–5.1)
PROT UR QL STRIP: NEGATIVE
PROTEIN, POC: NEGATIVE
PROTHROMBIN TIME: 14.4 SECONDS (ref 11.7–14.7)
RBC # BLD AUTO: 3.17 M/UL (ref 4.2–5.4)
RBC # UR STRIP: NEGATIVE /UL
SODIUM SERPL-SCNC: 140 MMOL/L (ref 136–145)
SP GR UR STRIP: 1.02
SPECIFIC GRAVITY, POC UA: 1.02
UROBILINOGEN UR STRIP-ACNC: NORMAL MG/DL
UROBILINOGEN, POC UA: 0.2
WBC # BLD AUTO: 4.73 K/UL (ref 4.5–11)

## 2024-02-27 PROCEDURE — 3288F FALL RISK ASSESSMENT DOCD: CPT | Mod: ,,, | Performed by: STUDENT IN AN ORGANIZED HEALTH CARE EDUCATION/TRAINING PROGRAM

## 2024-02-27 PROCEDURE — 1101F PT FALLS ASSESS-DOCD LE1/YR: CPT | Mod: ,,, | Performed by: STUDENT IN AN ORGANIZED HEALTH CARE EDUCATION/TRAINING PROGRAM

## 2024-02-27 PROCEDURE — 99214 OFFICE O/P EST MOD 30 MIN: CPT | Mod: ,,, | Performed by: STUDENT IN AN ORGANIZED HEALTH CARE EDUCATION/TRAINING PROGRAM

## 2024-02-27 PROCEDURE — 3075F SYST BP GE 130 - 139MM HG: CPT | Mod: ,,, | Performed by: STUDENT IN AN ORGANIZED HEALTH CARE EDUCATION/TRAINING PROGRAM

## 2024-02-27 PROCEDURE — 81003 URINALYSIS AUTO W/O SCOPE: CPT | Mod: QW,,, | Performed by: CLINICAL MEDICAL LABORATORY

## 2024-02-27 PROCEDURE — 3079F DIAST BP 80-89 MM HG: CPT | Mod: ,,, | Performed by: STUDENT IN AN ORGANIZED HEALTH CARE EDUCATION/TRAINING PROGRAM

## 2024-02-27 PROCEDURE — 3008F BODY MASS INDEX DOCD: CPT | Mod: ,,, | Performed by: STUDENT IN AN ORGANIZED HEALTH CARE EDUCATION/TRAINING PROGRAM

## 2024-02-27 PROCEDURE — 80048 BASIC METABOLIC PNL TOTAL CA: CPT | Mod: ,,, | Performed by: CLINICAL MEDICAL LABORATORY

## 2024-02-27 PROCEDURE — 93010 ELECTROCARDIOGRAM REPORT: CPT | Mod: ,,, | Performed by: STUDENT IN AN ORGANIZED HEALTH CARE EDUCATION/TRAINING PROGRAM

## 2024-02-27 PROCEDURE — 1126F AMNT PAIN NOTED NONE PRSNT: CPT | Mod: ,,, | Performed by: STUDENT IN AN ORGANIZED HEALTH CARE EDUCATION/TRAINING PROGRAM

## 2024-02-27 PROCEDURE — 93005 ELECTROCARDIOGRAM TRACING: CPT | Mod: ,,, | Performed by: STUDENT IN AN ORGANIZED HEALTH CARE EDUCATION/TRAINING PROGRAM

## 2024-02-27 PROCEDURE — 81003 URINALYSIS AUTO W/O SCOPE: CPT | Mod: QW,,, | Performed by: STUDENT IN AN ORGANIZED HEALTH CARE EDUCATION/TRAINING PROGRAM

## 2024-02-27 PROCEDURE — 85025 COMPLETE CBC W/AUTO DIFF WBC: CPT | Mod: ,,, | Performed by: CLINICAL MEDICAL LABORATORY

## 2024-02-27 RX ORDER — TOPIRAMATE 25 MG/1
25 TABLET ORAL 2 TIMES DAILY
COMMUNITY
End: 2024-03-01 | Stop reason: SDUPTHER

## 2024-02-27 RX ORDER — TRAZODONE HYDROCHLORIDE 50 MG/1
50 TABLET ORAL NIGHTLY
COMMUNITY
End: 2024-03-01 | Stop reason: SDUPTHER

## 2024-02-27 RX ORDER — ASPIRIN 81 MG/1
81 TABLET ORAL DAILY
Status: ON HOLD | COMMUNITY
End: 2024-03-15 | Stop reason: HOSPADM

## 2024-02-27 RX ORDER — DEXTROMETHORPHAN HYDROBROMIDE, GUAIFENESIN 5; 100 MG/5ML; MG/5ML
650 LIQUID ORAL EVERY 8 HOURS
Status: ON HOLD | COMMUNITY
End: 2024-03-15 | Stop reason: HOSPADM

## 2024-02-27 RX ORDER — OMEPRAZOLE 20 MG/1
20 CAPSULE, DELAYED RELEASE ORAL
COMMUNITY
Start: 2023-11-27 | End: 2024-03-01 | Stop reason: SDUPTHER

## 2024-03-01 VITALS
OXYGEN SATURATION: 99 % | SYSTOLIC BLOOD PRESSURE: 136 MMHG | DIASTOLIC BLOOD PRESSURE: 84 MMHG | BODY MASS INDEX: 30.63 KG/M2 | WEIGHT: 156 LBS | HEIGHT: 60 IN | TEMPERATURE: 98 F | HEART RATE: 60 BPM | RESPIRATION RATE: 19 BRPM

## 2024-03-01 RX ORDER — ATENOLOL AND CHLORTHALIDONE TABLET 50; 25 MG/1; MG/1
1 TABLET ORAL DAILY
Qty: 90 TABLET | Refills: 1 | Status: SHIPPED | OUTPATIENT
Start: 2024-03-01

## 2024-03-01 RX ORDER — OMEPRAZOLE 20 MG/1
20 CAPSULE, DELAYED RELEASE ORAL DAILY
Qty: 90 CAPSULE | Refills: 1 | Status: SHIPPED | OUTPATIENT
Start: 2024-03-01

## 2024-03-01 RX ORDER — TRAZODONE HYDROCHLORIDE 50 MG/1
50 TABLET ORAL NIGHTLY
Qty: 90 TABLET | Refills: 1 | Status: SHIPPED | OUTPATIENT
Start: 2024-03-01

## 2024-03-01 RX ORDER — TOPIRAMATE 25 MG/1
25 TABLET ORAL 2 TIMES DAILY
Qty: 180 TABLET | Refills: 1 | Status: SHIPPED | OUTPATIENT
Start: 2024-03-01

## 2024-03-01 RX ORDER — POTASSIUM CHLORIDE 750 MG/1
10 TABLET, EXTENDED RELEASE ORAL DAILY
Qty: 90 TABLET | Refills: 1 | Status: SHIPPED | OUTPATIENT
Start: 2024-03-01

## 2024-03-01 RX ORDER — FLUOXETINE HYDROCHLORIDE 20 MG/1
20 CAPSULE ORAL 2 TIMES DAILY
Qty: 180 CAPSULE | Refills: 1 | Status: SHIPPED | OUTPATIENT
Start: 2024-03-01

## 2024-03-01 RX ORDER — CHOLECALCIFEROL (VITAMIN D3) 25 MCG
1000 TABLET ORAL DAILY
Qty: 90 TABLET | Refills: 1 | Status: SHIPPED | OUTPATIENT
Start: 2024-03-01 | End: 2025-03-01

## 2024-03-07 ENCOUNTER — OFFICE VISIT (OUTPATIENT)
Dept: ORTHOPEDICS | Facility: CLINIC | Age: 69
End: 2024-03-07
Payer: COMMERCIAL

## 2024-03-07 ENCOUNTER — HOSPITAL ENCOUNTER (OUTPATIENT)
Dept: RADIOLOGY | Facility: HOSPITAL | Age: 69
Discharge: HOME | End: 2024-03-07
Attending: ORTHOPAEDIC SURGERY
Payer: COMMERCIAL

## 2024-03-07 VITALS — HEIGHT: 60 IN | WEIGHT: 156 LBS | BODY MASS INDEX: 30.63 KG/M2

## 2024-03-07 DIAGNOSIS — M16.12 OSTEOARTHRITIS OF LEFT HIP, UNSPECIFIED OSTEOARTHRITIS TYPE: ICD-10-CM

## 2024-03-07 DIAGNOSIS — M16.12 PRIMARY OSTEOARTHRITIS OF LEFT HIP: Primary | ICD-10-CM

## 2024-03-07 PROCEDURE — 99213 OFFICE O/P EST LOW 20 MIN: CPT | Mod: S$PBB,,,

## 2024-03-07 PROCEDURE — 99213 OFFICE O/P EST LOW 20 MIN: CPT | Mod: PBBFAC,25

## 2024-03-07 PROCEDURE — 73700 CT LOWER EXTREMITY W/O DYE: CPT | Mod: 26,LT,, | Performed by: RADIOLOGY

## 2024-03-07 PROCEDURE — 73700 CT LOWER EXTREMITY W/O DYE: CPT | Mod: TC,LT

## 2024-03-07 PROCEDURE — 3008F BODY MASS INDEX DOCD: CPT | Mod: CPTII,,,

## 2024-03-07 PROCEDURE — 1159F MED LIST DOCD IN RCRD: CPT | Mod: CPTII,,,

## 2024-03-07 NOTE — PATIENT INSTRUCTIONS
Primary osteoarthritis left hip.  Planned left hip arthroplasty on 03/14 with Dr. Blanco.  Discussed all preoperative and postoperative expectations.  Discussed surgical plan in detail including all risks, benefits, and alternatives to which patient voiced understanding.  Discussed that she was not able to attend most recent joint class, there is another 1 on 03/13 day prior to her surgery.  Discussed importance of attending joint class.  She is undergone all the operative visits, images, and lab work, PCP has cleared her for surgery.  Discussed that Dr. Bella Antony's nurse, we will call her day prior to surgery and inform her what time to be there.

## 2024-03-07 NOTE — PROGRESS NOTES
CC:   Chief Complaint   Patient presents with    Left Hip - Pain        Radha Waldrop is a 68 y.o. female seen today for Pain of the Left Hip  Patient known to Dr. Blanco for ongoing left hip pain.  Symptoms began over the past 3-4 months.  She has been experiencing groin and thigh pain worse with weight-bearing.  She is taken Tylenol for pain control.  Previous right TKR performed elsewhere.  She lives alone in [place], [place].  Her  is .  She is undergone preoperative clearance with the EKG, chest x-ray, and visit with her PCP.  She was unable to attend recent joint class.  She denies any new changes in her health status.  Previous x-rays with severe DJD of left hip with obliteration of the joint space and marginal osteophyte formation.    PAST MEDICAL HISTORY:   Past Medical History:   Diagnosis Date    Essential (primary) hypertension           PAST SURGICAL HISTORY:   Past Surgical History:   Procedure Laterality Date    KNEE SURGERY Right           ALLERGIES:   Review of patient's allergies indicates:   Allergen Reactions    Ace inhibitors Swelling        MEDICATIONS :    Current Outpatient Medications:     acetaminophen (TYLENOL) 650 MG TbSR, Take 650 mg by mouth every 8 (eight) hours., Disp: , Rfl:     aspirin (ECOTRIN) 81 MG EC tablet, Take 81 mg by mouth once daily., Disp: , Rfl:     atenoloL-chlorthalidone (TENORETIC) 50-25 mg Tab, Take 1 tablet by mouth once daily., Disp: 90 tablet, Rfl: 1    FLUoxetine 20 MG capsule, Take 1 capsule (20 mg total) by mouth 2 (two) times daily., Disp: 180 capsule, Rfl: 1    meloxicam (MOBIC) 15 MG tablet, Take 1 tablet (15 mg total) by mouth once daily., Disp: 30 tablet, Rfl: 2    omeprazole (PRILOSEC) 20 MG capsule, Take 1 capsule (20 mg total) by mouth once daily., Disp: 90 capsule, Rfl: 1    potassium chloride (KLOR-CON) 10 MEQ TbSR, Take 1 tablet (10 mEq total) by mouth once daily., Disp: 90 tablet, Rfl: 1    topiramate  (TOPAMAX) 25 MG tablet, Take 1 tablet (25 mg total) by mouth 2 (two) times daily., Disp: 180 tablet, Rfl: 1    traZODone (DESYREL) 50 MG tablet, Take 1 tablet (50 mg total) by mouth every evening., Disp: 90 tablet, Rfl: 1    vitamin D (VITAMIN D3) 1000 units Tab, Take 1 tablet (1,000 Units total) by mouth once daily., Disp: 90 tablet, Rfl: 1     SOCIAL HISTORY:   Social History     Socioeconomic History    Marital status:    Tobacco Use    Smoking status: Never     Passive exposure: Never    Smokeless tobacco: Never   Substance and Sexual Activity    Alcohol use: Never    Drug use: Never    Sexual activity: Not Currently     Partners: Male        FAMILY HISTORY:   Family History   Problem Relation Age of Onset    Diabetes Mother     Lung cancer Father           PHYSICAL EXAM:      There were no vitals filed for this visit.  Body mass index is 30.47 kg/m².    GENERAL: Well-developed, well-nourished female . The patient is alert, oriented and cooperative.    HEENT:  Normocephalic, atraumatic.  Extraocular movements are intact bilaterally.     NECK:  Nontender with good range of motion.    LUNGS:  Clear to auscultation bilaterally.    HEART:  Regular rate and rhythm.     ABDOMEN:  Soft, non-tender, non-distended.      EXTREMITIES:  Left hip with skin clean dry and intact, decreased range of motion with hip flexion internal and external rotation, neurovascularly intact      RADIOGRAPHIC FINDINGS:   CT Hip w/o Contrast Left w/[facility] Protocol    Result Date: 3/7/2024  EXAMINATION: CT HIP WITHOUT CONTRAST LEFT W/MANUEL PROTOCOL CLINICAL HISTORY: Unilateral primary osteoarthritis, left hipsurgery planning; COMPARISON: None TECHNIQUE: Multiple axial tomographic images of the pelvis and bilateral knees was performed without the use of intravenous contrast or surgical planning purposes. Coronal and sagittal reformatted images provided. FINDINGS: Severe degenerative change of the left hip with subchondral cystic change.   Moderate degenerative change of the right hip.  Total right hip arthroplasty.  Severe tricompartmental degenerative change of the left knee.  Moderate size left-sided periumbilical hernia with hernia sac containing small fluid.     CT for surgical planning purposes. The CT exam was performed using one or more of the following dose reduction techniques- Automated exposure control, adjustment of the mA and/or kV according to patient size, and/or use of iterative reconstructed technique. Point of Service: [facility] Electronically signed by: Mehul Stewart Date:    03/07/2024 Time:    14:06       Patient Active Problem List    Diagnosis Date Noted    Osteoarthritis of left hip 01/22/2024    Primary hypertension 01/09/2024    Gout 01/09/2024    Anxiety 01/09/2024    Vitamin D deficiency 01/09/2024    Gastroesophageal reflux disease 01/09/2024     IMPRESSION AND PLAN:  Primary osteoarthritis left hip.  Planned left hip arthroplasty on 03/14 with Dr. Blanco.  Discussed all preoperative and postoperative expectations.  Discussed surgical plan in detail including all risks, benefits, and alternatives to which patient voiced understanding.  Discussed that she was not able to attend most recent joint class, there is another 1 on 03/13 day prior to her surgery.  Discussed importance of attending joint class.  She is undergone all the operative visits, images, and lab work, PCP has cleared her for surgery.  Discussed that Dr. Bella Antony's nurse, we will call her day prior to surgery and inform her what time to be there.     No follow-ups on file.       Omaira Isidro PA-C      (Subject to voice recognition error, transcription service not allowed)

## 2024-03-11 DIAGNOSIS — Z12.11 COLON CANCER SCREENING: Primary | ICD-10-CM

## 2024-03-11 NOTE — PROGRESS NOTES
Patient previously reported having then up-to-date on colonoscopy.  Records were requested from [facility] but they did not have any report of recent colonoscopy.  Patient would like to have colonoscopy performed.  She would like to have it ordered and performed at [facility].  Colonoscopy ordered.

## 2024-03-14 ENCOUNTER — HOSPITAL ENCOUNTER (OUTPATIENT)
Facility: HOSPITAL | Age: 69
Discharge: HOME HEALTH | End: 2024-03-15
Attending: ORTHOPAEDIC SURGERY | Admitting: ORTHOPAEDIC SURGERY
Payer: COMMERCIAL

## 2024-03-14 ENCOUNTER — ANESTHESIA EVENT (OUTPATIENT)
Dept: SURGERY | Facility: HOSPITAL | Age: 69
End: 2024-03-14
Payer: COMMERCIAL

## 2024-03-14 ENCOUNTER — ANESTHESIA (OUTPATIENT)
Dept: SURGERY | Facility: HOSPITAL | Age: 69
End: 2024-03-14
Payer: COMMERCIAL

## 2024-03-14 DIAGNOSIS — Z96.642 S/P TOTAL LEFT HIP ARTHROPLASTY: ICD-10-CM

## 2024-03-14 DIAGNOSIS — M16.12 PRIMARY OSTEOARTHRITIS OF LEFT HIP: ICD-10-CM

## 2024-03-14 DIAGNOSIS — M16.12 OSTEOARTHRITIS OF LEFT HIP, UNSPECIFIED OSTEOARTHRITIS TYPE: Primary | ICD-10-CM

## 2024-03-14 PROBLEM — G47.00 INSOMNIA, UNSPECIFIED: Status: ACTIVE | Noted: 2024-03-14

## 2024-03-14 PROBLEM — G47.09 OTHER INSOMNIA: Status: ACTIVE | Noted: 2024-03-14

## 2024-03-14 PROBLEM — G43.009 NONINTRACTABLE MIGRAINE: Status: ACTIVE | Noted: 2024-03-14

## 2024-03-14 LAB
ANION GAP SERPL CALCULATED.3IONS-SCNC: 7 MMOL/L (ref 7–16)
ANTIBODY IDENTIFICATION: NORMAL
BASOPHILS # BLD AUTO: 0.02 K/UL (ref 0–0.2)
BASOPHILS NFR BLD AUTO: 0.2 % (ref 0–1)
BUN SERPL-MCNC: 18 MG/DL (ref 7–18)
BUN/CREAT SERPL: 18 (ref 6–20)
CALCIUM SERPL-MCNC: 9.9 MG/DL (ref 8.5–10.1)
CHLORIDE SERPL-SCNC: 112 MMOL/L (ref 98–107)
CO2 SERPL-SCNC: 25 MMOL/L (ref 21–32)
CREAT SERPL-MCNC: 1.02 MG/DL (ref 0.55–1.02)
DIFFERENTIAL METHOD BLD: ABNORMAL
EGFR (NO RACE VARIABLE) (RUSH/TITUS): 60 ML/MIN/1.73M2
EOSINOPHIL # BLD AUTO: 0.07 K/UL (ref 0–0.5)
EOSINOPHIL NFR BLD AUTO: 0.7 % (ref 1–4)
ERYTHROCYTE [DISTWIDTH] IN BLOOD BY AUTOMATED COUNT: 11.4 % (ref 11.5–14.5)
GLUCOSE SERPL-MCNC: 115 MG/DL (ref 74–106)
GROUP & RH: NORMAL
HCT VFR BLD AUTO: 30.9 % (ref 38–47)
HGB BLD-MCNC: 10.1 G/DL (ref 12–16)
IMM GRANULOCYTES # BLD AUTO: 0.07 K/UL (ref 0–0.04)
IMM GRANULOCYTES NFR BLD: 0.7 % (ref 0–0.4)
INDIRECT COOMBS: NORMAL
LYMPHOCYTES # BLD AUTO: 1.42 K/UL (ref 1–4.8)
LYMPHOCYTES NFR BLD AUTO: 13.3 % (ref 27–41)
MCH RBC QN AUTO: 34.2 PG (ref 27–31)
MCHC RBC AUTO-ENTMCNC: 32.7 G/DL (ref 32–36)
MCV RBC AUTO: 104.7 FL (ref 80–96)
MONOCYTES # BLD AUTO: 0.2 K/UL (ref 0–0.8)
MONOCYTES NFR BLD AUTO: 1.9 % (ref 2–6)
MPC BLD CALC-MCNC: 11.3 FL (ref 9.4–12.4)
NEUTROPHILS # BLD AUTO: 8.87 K/UL (ref 1.8–7.7)
NEUTROPHILS NFR BLD AUTO: 83.2 % (ref 53–65)
NRBC # BLD AUTO: 0 X10E3/UL
NRBC, AUTO (.00): 0 %
PLATELET # BLD AUTO: 161 K/UL (ref 150–400)
POTASSIUM SERPL-SCNC: 3.8 MMOL/L (ref 3.5–5.1)
RBC # BLD AUTO: 2.95 M/UL (ref 4.2–5.4)
SODIUM SERPL-SCNC: 140 MMOL/L (ref 136–145)
SPECIMEN OUTDATE: NORMAL
WBC # BLD AUTO: 10.65 K/UL (ref 4.5–11)

## 2024-03-14 PROCEDURE — 0055T BONE SRGRY CMPTR CT/MRI IMAG: CPT | Mod: ,,, | Performed by: ORTHOPAEDIC SURGERY

## 2024-03-14 PROCEDURE — C1713 ANCHOR/SCREW BN/BN,TIS/BN: HCPCS | Performed by: ORTHOPAEDIC SURGERY

## 2024-03-14 PROCEDURE — 25000003 PHARM REV CODE 250

## 2024-03-14 PROCEDURE — C1776 JOINT DEVICE (IMPLANTABLE): HCPCS | Performed by: ORTHOPAEDIC SURGERY

## 2024-03-14 PROCEDURE — 36000712 HC OR TIME LEV V 1ST 15 MIN: Performed by: ORTHOPAEDIC SURGERY

## 2024-03-14 PROCEDURE — 86902 BLOOD TYPE ANTIGEN DONOR EA: CPT | Performed by: ORTHOPAEDIC SURGERY

## 2024-03-14 PROCEDURE — 99214 OFFICE O/P EST MOD 30 MIN: CPT | Mod: ,,, | Performed by: INTERNAL MEDICINE

## 2024-03-14 PROCEDURE — 71000039 HC RECOVERY, EACH ADD'L HOUR: Performed by: ORTHOPAEDIC SURGERY

## 2024-03-14 PROCEDURE — 27130 TOTAL HIP ARTHROPLASTY: CPT | Mod: LT,,, | Performed by: ORTHOPAEDIC SURGERY

## 2024-03-14 PROCEDURE — 27201423 OPTIME MED/SURG SUP & DEVICES STERILE SUPPLY: Performed by: ORTHOPAEDIC SURGERY

## 2024-03-14 PROCEDURE — D9220A PRA ANESTHESIA: Mod: CRNA,,, | Performed by: NURSE ANESTHETIST, CERTIFIED REGISTERED

## 2024-03-14 PROCEDURE — 63600175 PHARM REV CODE 636 W HCPCS: Performed by: NURSE ANESTHETIST, CERTIFIED REGISTERED

## 2024-03-14 PROCEDURE — 97165 OT EVAL LOW COMPLEX 30 MIN: CPT

## 2024-03-14 PROCEDURE — 88311 DECALCIFY TISSUE: CPT | Mod: 26,,, | Performed by: PATHOLOGY

## 2024-03-14 PROCEDURE — 63600175 PHARM REV CODE 636 W HCPCS: Performed by: ORTHOPAEDIC SURGERY

## 2024-03-14 PROCEDURE — D9220A PRA ANESTHESIA: Mod: ANES,,, | Performed by: ANESTHESIOLOGY

## 2024-03-14 PROCEDURE — 64447 NJX AA&/STRD FEMORAL NRV IMG: CPT | Mod: 59,LT,, | Performed by: ANESTHESIOLOGY

## 2024-03-14 PROCEDURE — 71000033 HC RECOVERY, INTIAL HOUR: Performed by: ORTHOPAEDIC SURGERY

## 2024-03-14 PROCEDURE — 97161 PT EVAL LOW COMPLEX 20 MIN: CPT

## 2024-03-14 PROCEDURE — 94761 N-INVAS EAR/PLS OXIMETRY MLT: CPT

## 2024-03-14 PROCEDURE — 36000713 HC OR TIME LEV V EA ADD 15 MIN: Performed by: ORTHOPAEDIC SURGERY

## 2024-03-14 PROCEDURE — 99900035 HC TECH TIME PER 15 MIN (STAT)

## 2024-03-14 PROCEDURE — C1769 GUIDE WIRE: HCPCS | Performed by: ORTHOPAEDIC SURGERY

## 2024-03-14 PROCEDURE — 25000003 PHARM REV CODE 250: Performed by: ORTHOPAEDIC SURGERY

## 2024-03-14 PROCEDURE — 86905 BLOOD TYPING RBC ANTIGENS: CPT | Mod: 91 | Performed by: ORTHOPAEDIC SURGERY

## 2024-03-14 PROCEDURE — 37000009 HC ANESTHESIA EA ADD 15 MINS: Performed by: ORTHOPAEDIC SURGERY

## 2024-03-14 PROCEDURE — 80048 BASIC METABOLIC PNL TOTAL CA: CPT | Performed by: ORTHOPAEDIC SURGERY

## 2024-03-14 PROCEDURE — 85025 COMPLETE CBC W/AUTO DIFF WBC: CPT | Performed by: ORTHOPAEDIC SURGERY

## 2024-03-14 PROCEDURE — 37000008 HC ANESTHESIA 1ST 15 MINUTES: Performed by: ORTHOPAEDIC SURGERY

## 2024-03-14 PROCEDURE — 88304 TISSUE EXAM BY PATHOLOGIST: CPT | Mod: 26,,, | Performed by: PATHOLOGY

## 2024-03-14 PROCEDURE — 25000003 PHARM REV CODE 250: Performed by: NURSE ANESTHETIST, CERTIFIED REGISTERED

## 2024-03-14 PROCEDURE — 86870 RBC ANTIBODY IDENTIFICATION: CPT | Performed by: ORTHOPAEDIC SURGERY

## 2024-03-14 PROCEDURE — 86850 RBC ANTIBODY SCREEN: CPT | Performed by: ORTHOPAEDIC SURGERY

## 2024-03-14 PROCEDURE — 88304 TISSUE EXAM BY PATHOLOGIST: CPT | Mod: TC,SUR | Performed by: ORTHOPAEDIC SURGERY

## 2024-03-14 PROCEDURE — 99900031 HC PATIENT EDUCATION (STAT)

## 2024-03-14 PROCEDURE — 63600175 PHARM REV CODE 636 W HCPCS: Performed by: ANESTHESIOLOGY

## 2024-03-14 PROCEDURE — 63600175 PHARM REV CODE 636 W HCPCS

## 2024-03-14 DEVICE — 6.5MM LOW PROFILE HEX SCREW 30MM
Type: IMPLANTABLE DEVICE | Site: HIP | Status: FUNCTIONAL
Brand: TRIDENT II

## 2024-03-14 DEVICE — LINER- CEMENTLESS
Type: IMPLANTABLE DEVICE | Site: HIP | Status: FUNCTIONAL
Brand: MDM

## 2024-03-14 DEVICE — RESTORATION ADM/MDM X3 INSERT
Type: IMPLANTABLE DEVICE | Site: HIP | Status: FUNCTIONAL
Brand: RESTORATION ADM/MDM X3 INSERT

## 2024-03-14 DEVICE — TRIDENT II TRITANIUM CLUSTER 52E
Type: IMPLANTABLE DEVICE | Site: FEMUR | Status: FUNCTIONAL
Brand: TRIDENT II

## 2024-03-14 DEVICE — LFIT V40 FEMORAL HEAD
Type: IMPLANTABLE DEVICE | Site: HIP | Status: FUNCTIONAL
Brand: V40 HEAD

## 2024-03-14 RX ORDER — HYDROMORPHONE HYDROCHLORIDE 2 MG/ML
0.5 INJECTION, SOLUTION INTRAMUSCULAR; INTRAVENOUS; SUBCUTANEOUS EVERY 5 MIN PRN
Status: DISCONTINUED | OUTPATIENT
Start: 2024-03-14 | End: 2024-03-14 | Stop reason: HOSPADM

## 2024-03-14 RX ORDER — MORPHINE SULFATE 10 MG/ML
4 INJECTION INTRAMUSCULAR; INTRAVENOUS; SUBCUTANEOUS EVERY 5 MIN PRN
Status: DISCONTINUED | OUTPATIENT
Start: 2024-03-14 | End: 2024-03-14 | Stop reason: HOSPADM

## 2024-03-14 RX ORDER — BISACODYL 10 MG/1
10 SUPPOSITORY RECTAL DAILY PRN
Status: DISCONTINUED | OUTPATIENT
Start: 2024-03-14 | End: 2024-03-15 | Stop reason: HOSPADM

## 2024-03-14 RX ORDER — HYDRALAZINE HYDROCHLORIDE 20 MG/ML
10 INJECTION INTRAMUSCULAR; INTRAVENOUS EVERY 6 HOURS PRN
Status: DISCONTINUED | OUTPATIENT
Start: 2024-03-14 | End: 2024-03-15 | Stop reason: SDUPTHER

## 2024-03-14 RX ORDER — ONDANSETRON HYDROCHLORIDE 2 MG/ML
4 INJECTION, SOLUTION INTRAVENOUS DAILY PRN
Status: DISCONTINUED | OUTPATIENT
Start: 2024-03-14 | End: 2024-03-14 | Stop reason: HOSPADM

## 2024-03-14 RX ORDER — PANTOPRAZOLE SODIUM 40 MG/1
40 TABLET, DELAYED RELEASE ORAL DAILY
Status: DISCONTINUED | OUTPATIENT
Start: 2024-03-15 | End: 2024-03-15

## 2024-03-14 RX ORDER — SODIUM CHLORIDE, SODIUM LACTATE, POTASSIUM CHLORIDE, CALCIUM CHLORIDE 600; 310; 30; 20 MG/100ML; MG/100ML; MG/100ML; MG/100ML
INJECTION, SOLUTION INTRAVENOUS CONTINUOUS
Status: DISCONTINUED | OUTPATIENT
Start: 2024-03-14 | End: 2024-03-15

## 2024-03-14 RX ORDER — PROPOFOL 10 MG/ML
VIAL (ML) INTRAVENOUS
Status: DISCONTINUED | OUTPATIENT
Start: 2024-03-14 | End: 2024-03-14

## 2024-03-14 RX ORDER — CEFAZOLIN SODIUM 1 G/3ML
INJECTION, POWDER, FOR SOLUTION INTRAMUSCULAR; INTRAVENOUS
Status: DISCONTINUED | OUTPATIENT
Start: 2024-03-14 | End: 2024-03-14

## 2024-03-14 RX ORDER — MIDAZOLAM HYDROCHLORIDE 1 MG/ML
INJECTION INTRAMUSCULAR; INTRAVENOUS
Status: DISCONTINUED | OUTPATIENT
Start: 2024-03-14 | End: 2024-03-14

## 2024-03-14 RX ORDER — ASPIRIN 81 MG/1
81 TABLET ORAL DAILY
Status: DISCONTINUED | OUTPATIENT
Start: 2024-03-15 | End: 2024-03-15 | Stop reason: HOSPADM

## 2024-03-14 RX ORDER — CHOLECALCIFEROL (VITAMIN D3) 25 MCG
1000 TABLET ORAL DAILY
Status: DISCONTINUED | OUTPATIENT
Start: 2024-03-15 | End: 2024-03-15 | Stop reason: HOSPADM

## 2024-03-14 RX ORDER — POTASSIUM CHLORIDE 750 MG/1
10 TABLET, EXTENDED RELEASE ORAL DAILY
Status: DISCONTINUED | OUTPATIENT
Start: 2024-03-15 | End: 2024-03-15 | Stop reason: HOSPADM

## 2024-03-14 RX ORDER — EPHEDRINE SULFATE 50 MG/ML
INJECTION, SOLUTION INTRAVENOUS
Status: DISCONTINUED | OUTPATIENT
Start: 2024-03-14 | End: 2024-03-14

## 2024-03-14 RX ORDER — MEPERIDINE HYDROCHLORIDE 25 MG/ML
25 INJECTION INTRAMUSCULAR; INTRAVENOUS; SUBCUTANEOUS EVERY 10 MIN PRN
Status: DISCONTINUED | OUTPATIENT
Start: 2024-03-14 | End: 2024-03-14 | Stop reason: HOSPADM

## 2024-03-14 RX ORDER — ONDANSETRON HYDROCHLORIDE 2 MG/ML
INJECTION, SOLUTION INTRAVENOUS
Status: DISCONTINUED | OUTPATIENT
Start: 2024-03-14 | End: 2024-03-14

## 2024-03-14 RX ORDER — TOPIRAMATE 25 MG/1
25 TABLET ORAL 2 TIMES DAILY
Status: DISCONTINUED | OUTPATIENT
Start: 2024-03-14 | End: 2024-03-15 | Stop reason: HOSPADM

## 2024-03-14 RX ORDER — LIDOCAINE HYDROCHLORIDE 20 MG/ML
INJECTION, SOLUTION EPIDURAL; INFILTRATION; INTRACAUDAL; PERINEURAL
Status: DISCONTINUED | OUTPATIENT
Start: 2024-03-14 | End: 2024-03-14

## 2024-03-14 RX ORDER — DIPHENHYDRAMINE HYDROCHLORIDE 50 MG/ML
INJECTION INTRAMUSCULAR; INTRAVENOUS
Status: DISCONTINUED | OUTPATIENT
Start: 2024-03-14 | End: 2024-03-14

## 2024-03-14 RX ORDER — DEXAMETHASONE SODIUM PHOSPHATE 4 MG/ML
INJECTION, SOLUTION INTRA-ARTICULAR; INTRALESIONAL; INTRAMUSCULAR; INTRAVENOUS; SOFT TISSUE
Status: DISCONTINUED | OUTPATIENT
Start: 2024-03-14 | End: 2024-03-14

## 2024-03-14 RX ORDER — TRANEXAMIC ACID 100 MG/ML
INJECTION, SOLUTION INTRAVENOUS
Status: DISCONTINUED | OUTPATIENT
Start: 2024-03-14 | End: 2024-03-14

## 2024-03-14 RX ORDER — MELOXICAM 7.5 MG/1
15 TABLET ORAL DAILY
Status: DISCONTINUED | OUTPATIENT
Start: 2024-03-15 | End: 2024-03-15

## 2024-03-14 RX ORDER — HYDROCODONE BITARTRATE AND ACETAMINOPHEN 10; 325 MG/1; MG/1
1 TABLET ORAL EVERY 4 HOURS PRN
Status: DISCONTINUED | OUTPATIENT
Start: 2024-03-14 | End: 2024-03-15 | Stop reason: HOSPADM

## 2024-03-14 RX ORDER — MORPHINE SULFATE 4 MG/ML
4 INJECTION, SOLUTION INTRAMUSCULAR; INTRAVENOUS EVERY 4 HOURS PRN
Status: DISCONTINUED | OUTPATIENT
Start: 2024-03-14 | End: 2024-03-15

## 2024-03-14 RX ORDER — FLUOXETINE HYDROCHLORIDE 20 MG/1
20 CAPSULE ORAL 2 TIMES DAILY
Status: DISCONTINUED | OUTPATIENT
Start: 2024-03-14 | End: 2024-03-15 | Stop reason: HOSPADM

## 2024-03-14 RX ORDER — IPRATROPIUM BROMIDE AND ALBUTEROL SULFATE 2.5; .5 MG/3ML; MG/3ML
3 SOLUTION RESPIRATORY (INHALATION) ONCE
Status: DISCONTINUED | OUTPATIENT
Start: 2024-03-14 | End: 2024-03-14 | Stop reason: HOSPADM

## 2024-03-14 RX ORDER — BISOPROLOL FUMARATE AND HYDROCHLOROTHIAZIDE 5; 6.25 MG/1; MG/1
1 TABLET ORAL ONCE
Status: DISCONTINUED | OUTPATIENT
Start: 2024-03-14 | End: 2024-03-15

## 2024-03-14 RX ORDER — FENTANYL CITRATE 50 UG/ML
INJECTION, SOLUTION INTRAMUSCULAR; INTRAVENOUS
Status: DISCONTINUED | OUTPATIENT
Start: 2024-03-14 | End: 2024-03-14

## 2024-03-14 RX ORDER — DIPHENHYDRAMINE HYDROCHLORIDE 50 MG/ML
25 INJECTION INTRAMUSCULAR; INTRAVENOUS EVERY 6 HOURS PRN
Status: DISCONTINUED | OUTPATIENT
Start: 2024-03-14 | End: 2024-03-14 | Stop reason: HOSPADM

## 2024-03-14 RX ORDER — ONDANSETRON HYDROCHLORIDE 2 MG/ML
4 INJECTION, SOLUTION INTRAVENOUS EVERY 8 HOURS PRN
Status: DISCONTINUED | OUTPATIENT
Start: 2024-03-14 | End: 2024-03-15 | Stop reason: HOSPADM

## 2024-03-14 RX ORDER — TRAZODONE HYDROCHLORIDE 50 MG/1
50 TABLET ORAL NIGHTLY
Status: DISCONTINUED | OUTPATIENT
Start: 2024-03-14 | End: 2024-03-15 | Stop reason: HOSPADM

## 2024-03-14 RX ORDER — ROPIVACAINE HYDROCHLORIDE 7.5 MG/ML
INJECTION, SOLUTION EPIDURAL; PERINEURAL
Status: COMPLETED | OUTPATIENT
Start: 2024-03-14 | End: 2024-03-14

## 2024-03-14 RX ADMIN — FENTANYL CITRATE 100 MCG: 50 INJECTION INTRAMUSCULAR; INTRAVENOUS at 11:03

## 2024-03-14 RX ADMIN — MIDAZOLAM 2 MG: 1 INJECTION INTRAMUSCULAR; INTRAVENOUS at 09:03

## 2024-03-14 RX ADMIN — SODIUM CHLORIDE, POTASSIUM CHLORIDE, SODIUM LACTATE AND CALCIUM CHLORIDE: 600; 310; 30; 20 INJECTION, SOLUTION INTRAVENOUS at 09:03

## 2024-03-14 RX ADMIN — ROPIVACAINE HYDROCHLORIDE 30 ML: 7.5 INJECTION, SOLUTION EPIDURAL; PERINEURAL at 10:03

## 2024-03-14 RX ADMIN — HYDROCODONE BITARTRATE AND ACETAMINOPHEN 1 TABLET: 10; 325 TABLET ORAL at 10:03

## 2024-03-14 RX ADMIN — VANCOMYCIN HYDROCHLORIDE 1000 MG: 1 INJECTION, POWDER, LYOPHILIZED, FOR SOLUTION INTRAVENOUS at 10:03

## 2024-03-14 RX ADMIN — TRAZODONE HYDROCHLORIDE 50 MG: 50 TABLET ORAL at 09:03

## 2024-03-14 RX ADMIN — DEXAMETHASONE SODIUM PHOSPHATE 10 MG: 4 INJECTION, SOLUTION INTRA-ARTICULAR; INTRALESIONAL; INTRAMUSCULAR; INTRAVENOUS; SOFT TISSUE at 10:03

## 2024-03-14 RX ADMIN — CEFAZOLIN 2 G: 1 INJECTION, POWDER, FOR SOLUTION INTRAMUSCULAR; INTRAVENOUS; PARENTERAL at 10:03

## 2024-03-14 RX ADMIN — SODIUM CHLORIDE, POTASSIUM CHLORIDE, SODIUM LACTATE AND CALCIUM CHLORIDE: 600; 310; 30; 20 INJECTION, SOLUTION INTRAVENOUS at 10:03

## 2024-03-14 RX ADMIN — HYDROCODONE BITARTRATE AND ACETAMINOPHEN 1 TABLET: 10; 325 TABLET ORAL at 04:03

## 2024-03-14 RX ADMIN — LIDOCAINE HYDROCHLORIDE 100 MG: 20 INJECTION, SOLUTION INTRAVENOUS at 10:03

## 2024-03-14 RX ADMIN — TRANEXAMIC ACID 1000 MG: 100 INJECTION, SOLUTION INTRAVENOUS at 10:03

## 2024-03-14 RX ADMIN — FLUOXETINE 20 MG: 20 CAPSULE ORAL at 09:03

## 2024-03-14 RX ADMIN — TOPIRAMATE 25 MG: 25 TABLET, FILM COATED ORAL at 09:03

## 2024-03-14 RX ADMIN — PROPOFOL 100 MG: 10 INJECTION, EMULSION INTRAVENOUS at 10:03

## 2024-03-14 RX ADMIN — ONDANSETRON 4 MG: 2 INJECTION INTRAMUSCULAR; INTRAVENOUS at 09:03

## 2024-03-14 RX ADMIN — EPHEDRINE SULFATE 50 MG: 50 INJECTION INTRAVENOUS at 10:03

## 2024-03-14 RX ADMIN — TRANEXAMIC ACID 1000 MG: 100 INJECTION, SOLUTION INTRAVENOUS at 11:03

## 2024-03-14 RX ADMIN — DIPHENHYDRAMINE HYDROCHLORIDE 50 MG: 50 INJECTION, SOLUTION INTRAMUSCULAR; INTRAVENOUS at 09:03

## 2024-03-14 NOTE — ASSESSMENT & PLAN NOTE
Patient reported to have been diagnosed with insomnia few years ago. She is currently on Trazodone 50 mg QHS for sleep.  - continue home trazodone 50 mg QHS

## 2024-03-14 NOTE — PT/OT/SLP EVAL
Physical Therapy Evaluation     Patient Name: Radha Waldrop   MRN: [id number]  Recent Surgery: Procedure(s) (LRB):  ROBOTIC ARTHROPLASTY,HIP (Left) Day of Surgery    Recommendations:     Discharge Recommendations: Low Intensity Therapy   Discharge Equipment Recommendations: walker, rolling   Barriers to discharge: Ongoing medical treatment    Assessment:     Radha Waldrop is a 68 y.o. female admitted with a medical diagnosis of S/P total left hip arthroplasty. She presents with the following impairments/functional limitations: weakness, impaired functional mobility, gait instability, impaired balance, decreased lower extremity function, pain, orthopedic precautions. Pt presents with limited mobility s/p left total hip replacement. Pt requires minimal assistance with all mobility at this time. Has normal post op weakness. Ambulated well with rolling walker. Should be okay to return home at d/c    Rehab Prognosis: Good; patient would benefit from acute PT services to address these deficits and reach maximum level of function.    Plan:     During this hospitalization, patient to be seen BID (5x/wk, daily 2x/wk) to address the above listed problems via gait training, therapeutic activities, therapeutic exercises    Plan of Care Expires: 04/14/24    Subjective     Chief Complaint: left total hip replacement   Patient Comments/Goals: Pt is agreeable to PT   Pain/Comfort:  Pain Rating 1: 2/10  Location - Side 1: Left  Location 1: hip  Pain Addressed 1: Pre-medicate for activity  Pain Rating Post-Intervention 1: 2/10    Social History:  Living Environment: Patient lives with their son in a first floor apartment with tub-shower combo  Prior Level of Function: Prior to admission, patient was modified independent with ADLs using straight cane for mobility  Equipment Used at Home: cane, straight, shower chair  DME owned (not currently used): none  Assistance Upon Discharge: family    Objective:     Communicated with JOHANA  DOUGLAS Johns prior to session. Patient found HOB elevated with blood pressure cuff, hemovac, hip abduction pillow, pulse ox (continuous) upon PT entry to room.    General Precautions: Standard, fall   Orthopedic Precautions: LLE partial weight bearing, LLE posterior precautions   Braces: N/A    Respiratory Status: Room air    Exams:  Cognition: Patient is oriented to Person, Place, Time, Situation  RLE ROM: WFL  RLE Strength: WFL  LLE ROM: WFL except limited by hip precautions  LLE Strength:  3/5  Gross Motor Coordination: WFL  Sensation:    -       Intact    Functional Mobility:  Gait belt applied - Yes  Bed Mobility  Scooting: contact guard assistance  Supine to Sit: minimum assistance for LE management  Transfers  Sit to Stand: minimum assistance with rolling walker  Toilet Transfer: minimum assistance with rolling walker using Step Transfer  Gait  Patient ambulated 30 ft with rolling walker and contact guard assistance. Patient demonstrates decreased step length and decreased tejas. . All lines remained intact throughout ambulation trail.  Balance  Sitting: contact guard assistance  Standing: contact guard assistance      Therapeutic Activities and Exercises:   Patient educated on role of acute care PT and PT POC, safety while in hospital including calling nurse for mobility, and call light usage  Patient educated about importance of OOB mobility and remaining up in chair most of the day.  Pt educated in posterior hip precautions: no bending/flexing surgical hip  >  90 degrees, no crossing of surgical leg beyond midline, and no internal rotation of surgical leg beyond neutral (don't twist body toward surgical leg). Handout provided.      AM-PAC 6 CLICK MOBILITY  Total Score:18    Patient left up in chair with all lines intact and call button in reach.    GOALS:   Multidisciplinary Problems       Physical Therapy Goals          Problem: Physical Therapy    Goal Priority Disciplines Outcome Goal Variances  Interventions   Physical Therapy Goal     PT, PT/OT Ongoing, Progressing     Description: Short term goals:  Pt will perform supine to sit with contact guard assistance  Pt will perform sit to stand with contact guard assistance  Pt will independently verbalize hip precautions  Pt will ambulate 100 ft with contact guard assistanceusing rolling walker    Long term goals:  Pt will perform supine to sit with modified independence  Pt will perform sit to stand with modified independence  Pt will ambulate 300 ft with modified independenceusing rolling walker                         History:     Past Medical History:   Diagnosis Date    Arthritis     Essential (primary) hypertension     Generalized anxiety disorder     Insomnia     Migraines        Past Surgical History:   Procedure Laterality Date    knee replacement right Right     KNEE SURGERY Right        Time Tracking:     PT Received On: 03/14/24  PT Start Time: 1529  PT Stop Time: 1552  PT Total Time (min): 23 min     Billable Minutes: Evaluation low complexity    Clinical Information for Insurance Authorization     Dates of Services: 3/15/24 to 3/22/24  Discharge Plan: Patient will be discharged from skilled physical therapy treatment once all goals have been met, patient has plateaued, or physician/insurance requests discontinuation of care. Patient will be discharged with a home exercise program.   Type of therapy: Rehabilitative  ICD-10 Diagnosis Code(s): z96.642  Which side is symptomatic? Left  Surgical: Yes  Surgical procedure:  left total hip replacement   Surgery date:  3/14/24 .  Presenting symptoms/diagnoses are repetitive in nature.  Presenting symptoms are non-radiating in nature.   The rehabilitation is not related to a diagnosis of cancer.  The rehabilitation is not related to a diagnosis of lymphedema.  Patient's clinical presentation is:  Moderate objective and functional deficits: consistent symptoms and/or symptoms that are intensified with  activity with moderate loss of range of motion, strength, or ability to perform daily tasks  CPT Codes Requested:  88880 [therapeutic exercise], 38699 [gait training], and 05818 [therapeutic activities]       3/14/2024

## 2024-03-14 NOTE — CONSULTS
[facility]  [facility] Medicine  Consult Note    Patient Name: Radha Waldrop  MRN: [id number]  Admission Date: 3/14/2024  Hospital Length of Stay: 0 days  Attending Physician: Apolinar Blanco MD   Primary Care Provider: Mireya Barnes MD           Patient information was obtained from past medical records, ER records, and primary team.     Consults  Subjective:     Principal Problem: S/P total left hip arthroplasty    Chief Complaint: No chief complaint on file.       HPI: Pt is a 68 y.o. female s/p Robotic Arthoplasty of left hip for Primary osteoarthritis of left hip by Dr Apolinar Blanco. At time of exam, pt has no acute complaints or concerns. Pt  has a past medical history of Arthritis, Essential (primary) hypertension, Generalized anxiety disorder, Insomnia, and Migraines. Med Rec was completed at bedside. Current outpatient medications will be restarted as tolerated and are listed below:    Current Outpatient Medications   Medication Instructions    acetaminophen (TYLENOL) 650 mg, Oral, Every 8 hours    aspirin (ECOTRIN) 81 mg, Oral, Daily    atenoloL-chlorthalidone (TENORETIC) 50-25 mg Tab 1 tablet, Oral, Daily    FLUoxetine 20 mg, Oral, 2 times daily    meloxicam (MOBIC) 15 mg, Oral, Daily    omeprazole (PRILOSEC) 20 mg, Oral, Daily    potassium chloride (KLOR-CON) 10 MEQ TbSR 10 mEq, Oral, Daily    topiramate (TOPAMAX) 25 mg, Oral, 2 times daily    traZODone (DESYREL) 50 mg, Oral, Nightly    vitamin D (VITAMIN D3) 1,000 Units, Oral, Daily       This consult was performed under the direct supervision of Dr. Malachi Mckeon. Thank you for allowing the [facility] to be involved in the care of this patient.         Past Medical History:   Diagnosis Date    Arthritis     Essential (primary) hypertension     Generalized anxiety disorder     Insomnia     Migraines        Past Surgical History:   Procedure Laterality Date    KNEE SURGERY Right        Review of patient's allergies  indicates:   Allergen Reactions    Ace inhibitors Swelling       No current facility-administered medications on file prior to encounter.     Current Outpatient Medications on File Prior to Encounter   Medication Sig    meloxicam (MOBIC) 15 MG tablet Take 1 tablet (15 mg total) by mouth once daily.     Family History       Problem Relation (Age of Onset)    Diabetes Mother    Lung cancer Father          Tobacco Use    Smoking status: Never     Passive exposure: Never    Smokeless tobacco: Never   Substance and Sexual Activity    Alcohol use: Never    Drug use: Never    Sexual activity: Not Currently     Partners: Male     Review of Systems   Constitutional:  Negative for chills, diaphoresis, fatigue, fever and unexpected weight change.   HENT:  Negative for congestion, drooling, ear discharge, ear pain, postnasal drip, sinus pressure, sinus pain, sneezing and sore throat.    Eyes:  Negative for discharge, redness, itching and visual disturbance.   Cardiovascular:  Negative for chest pain, palpitations and leg swelling.   Gastrointestinal:  Negative for abdominal pain, constipation, diarrhea, nausea and vomiting.   Genitourinary:  Negative for difficulty urinating, dysuria, flank pain, frequency, hematuria and urgency.   Musculoskeletal:  Negative for arthralgias, back pain, joint swelling, myalgias, neck pain and neck stiffness.        Patient is s/p Robotic Arthoplasty of left hip.   Skin:  Negative for rash.   Neurological:  Negative for dizziness, syncope, weakness and headaches.   Psychiatric/Behavioral:  Negative for agitation, behavioral problems, confusion and decreased concentration.      Objective:     Vital Signs (Most Recent):  Temp: 98.6 °F (37 °C) (03/14/24 1436)  Pulse: (!) 58 (03/14/24 1436)  Resp: 20 (03/14/24 1436)  BP: (!) 176/80 (03/14/24 1436)  SpO2: 96 % (03/14/24 1436) Vital Signs (24h Range):  Temp:  [97.5 °F (36.4 °C)-98.6 °F (37 °C)] 98.6 °F (37 °C)  Pulse:  [53-82] 58  Resp:  [6-20]  20  SpO2:  [95 %-100 %] 96 %  BP: ()/(29-93) 176/80     Weight: 69.4 kg (153 lb)  Body mass index is 29.88 kg/m².     Physical Exam  Vitals and nursing note reviewed.   Constitutional:       General: She is not in acute distress.     Appearance: Normal appearance. She is obese. She is not ill-appearing.   HENT:      Head: Normocephalic and atraumatic.      Nose: Nose normal.      Mouth/Throat:      Mouth: Mucous membranes are moist.      Pharynx: Oropharynx is clear.   Eyes:      Extraocular Movements: Extraocular movements intact.      Conjunctiva/sclera: Conjunctivae normal.      Pupils: Pupils are equal, round, and reactive to light.   Cardiovascular:      Rate and Rhythm: Normal rate and regular rhythm.      Pulses: Normal pulses.      Heart sounds: Normal heart sounds. No murmur heard.  Pulmonary:      Effort: Pulmonary effort is normal. No respiratory distress.      Breath sounds: Normal breath sounds. No wheezing.   Abdominal:      General: Bowel sounds are normal. There is no distension.      Palpations: Abdomen is soft.      Tenderness: There is no abdominal tenderness. There is no right CVA tenderness or left CVA tenderness.   Musculoskeletal:         General: Normal range of motion.      Cervical back: Normal range of motion and neck supple. No tenderness.      Right lower leg: No edema.      Left lower leg: No edema.        Legs:       Comments: S/p arthroplasty of left hip    Skin:     Capillary Refill: Capillary refill takes less than 2 seconds.   Neurological:      General: No focal deficit present.      Mental Status: She is alert and oriented to person, place, and time.   Psychiatric:         Mood and Affect: Mood normal.         Behavior: Behavior normal.         Thought Content: Thought content normal.         Judgment: Judgment normal.          Significant Labs: All pertinent labs within the past 24 hours have been reviewed.    Significant Imaging: I have reviewed all pertinent imaging  results/findings within the past 24 hours.  Assessment/Plan:     * S/P total left hip arthroplasty  Patient has prolonged history of arthritis and has had total knee replacement on her right knee in 2010.  - Robotic Arthoplasty of left hip performed by Dr Blanco.  - monitor s/s      Nonintractable migraine    - continue home medications  - Topiramate 25 mg BID    Insomnia, unspecified  Patient reported to have been diagnosed with insomnia few years ago. She is currently on Trazodone 50 mg QHS for sleep.  - continue home trazodone 50 mg QHS       Gastroesophageal reflux disease  - Pantoprazole 40 mg daily    Vitamin D deficiency  Continue home medications  - vitamin D3 1000 units PO QD    Anxiety  Patient's symptoms are stable at the moment  - monitor s/s  - continue home medications  - Fluoxetine 20 mg BID      Primary hypertension  Chronic, controlled. Latest blood pressure and vitals reviewed-     Temp:  [97.5 °F (36.4 °C)-98.6 °F (37 °C)]   Pulse:  [53-82]   Resp:  [6-20]   BP: ()/(29-93)   SpO2:  [95 %-100 %] .   Home meds for hypertension were reviewed and noted below.   Hypertension Medications               atenoloL-chlorthalidone (TENORETIC) 50-25 mg Tab Take 1 tablet by mouth once daily.            While in the hospital, will manage blood pressure as follows; Continue home antihypertensive regimen    Will utilize p.r.n. blood pressure medication only if patient's blood pressure greater than 180/110 and she develops symptoms such as worsening chest pain or shortness of breath.  - continue bisoprolol-hydrochlorothiazide 5-6.25 mg per tablet 1 tablet  - potassium chloride SA CR tablet 10 mEq  - monitor BP   - Monitor BMP      VTE Risk Mitigation (From admission, onward)           Ordered     apixaban tablet 2.5 mg  2 times daily         03/14/24 1425     IP VTE HIGH RISK PATIENT  Once         03/14/24 1425     Place ALCIDES hose  Until discontinued        Comments: ALCIDES to BLE    03/14/24 1425     Place  sequential compression device  Until discontinued        Comments: BLE    03/14/24 1425     Place ALCIDES hose  Until discontinued         03/14/24 0829     Place sequential compression device  Until discontinued         03/14/24 0829                        Thank you for your consult. I will follow-up with patient. Please contact us if you have any additional questions.    Romy Villegas MD  Department of Hospital Medicine   [facility]

## 2024-03-14 NOTE — ASSESSMENT & PLAN NOTE
Problem: Cardiac Rhythm Disturbances with or without Devices  Goal: Hemodynamic stability achieved/maintained  Outcome: Outcome Not Met, Continue to Monitor  Goal: Anxiety is controlled  Outcome: Outcome Not Met, Continue to Monitor  Goal: Participates in ADL/Activity without s/s of intolerance  Outcome: Outcome Not Met, Continue to Monitor  Goal: Urinary elimination pattern returned to baseline  Description: Patient must be making adequate amounts of urine output (240cc/8 hours) and voiding. If indwelling urinary catheter: Remove asap (no later an Day 2 or provider must specify reason for continued use).  Outcome: Outcome Not Met, Continue to Monitor  Goal: Verbalizes understanding of rhythm disturbance, treatment procedure and pre, post-, and d/c care specific to intervention  Description: Document on Patient Education Activity  Outcome: Outcome Not Met, Continue to Monitor     Problem: Pain  Goal: #Acceptable pain level achieved/maintained at rest using NRS/Faces  Description: This goal is used for patients who can self-report.  Acceptable means the level is at or below the identified comfort/function goal.  Outcome: Outcome Not Met, Continue to Monitor  Goal: # Acceptable pain level achieved/maintained at rest using NRS/Faces without oversedation (opioid naive or PCA/Epidural infusion)  Description: This goal is used if Opioid-naïve or on PCA/Epidural Infusion.  Outcome: Outcome Not Met, Continue to Monitor  Goal: # Acceptable pain level achieved/maintained with activity using NRS/Faces  Description: This goal is used for patients who can self-report and are not achieving acceptable pain control during activity.  Outcome: Outcome Not Met, Continue to Monitor  Goal: Acceptable pain/comfort level is achieved/maintained at rest (based on Pain Behaviors Scale)  Description: This goal is used for patients who are not able to self-report pain and are assessed for pain using the Pain Behaviors Scale  Outcome: Outcome  Patient's symptoms are stable at the moment  - monitor s/s  - continue home medications  - Fluoxetine 20 mg BID     Not Met, Continue to Monitor  Goal: Acceptable pain/comfort level is achieved/maintained at rest based on PAINAID scale (Dementia)  Description: This goal is used for patients who are not able to self-report pain, have dementia, and assessed using the PAINAD scale.  Outcome: Outcome Not Met, Continue to Monitor  Goal: Acceptable pain/comfort level is achieved/maintained at rest (based on pediatric behavior tool: NIPS, NPASS, or FLACC)  Description: This goal is used for pediatric patients who are not able to self report pain.  Outcome: Outcome Not Met, Continue to Monitor  Goal: # Verbalizes understanding of pain management  Description: Documented in Patient Education Activity  Outcome: Outcome Not Met, Continue to Monitor  Goal: Verbalizes understanding and effective use of Patient Controlled Analgesia (PCA)  Description: Documented in Patient Education Activity  This goal is used for patients with PCA  Outcome: Outcome Not Met, Continue to Monitor  Goal: Maximum comfort achieved/maintained at end of life (Hospice)  Outcome: Outcome Not Met, Continue to Monitor     Problem: At Risk for Falls  Goal: # Patient does not fall  Outcome: Outcome Not Met, Continue to Monitor  Goal: # Takes action to control fall-related risks  Outcome: Outcome Not Met, Continue to Monitor  Goal: # Verbalizes understanding of fall risk/precautions  Description: Document education using the patient education activity  Outcome: Outcome Not Met, Continue to Monitor     Problem: At Risk for Injury Due to Fall  Goal: # Patient does not fall  Outcome: Outcome Not Met, Continue to Monitor  Goal: # Takes action to control condition specific risks  Outcome: Outcome Not Met, Continue to Monitor  Goal: # Verbalizes understanding of fall-related injury personal risks  Description: Document education using the patient education activity  Outcome: Outcome Not Met, Continue to Monitor     Problem: Impaired Physical Mobility  Goal: # Bed mobility,  ambulation, and ADLs are maintained or returned to baseline during hospitalization  Outcome: Outcome Not Met, Continue to Monitor     Patient alert and oriented x 4. Patient vitals stable, afebrile. All due medications given. No pain indicated at this time. Patient ble pain managed with tylenol prn. Patient a-fib on the monitor. Patient with stage 4 sacral wound and left heel pressure injury. Wound vac on sacral pressure injury. Patient turned Q2. Parenteral nutrition infusing @ 45 mL/hr. No signs of distress. Patient instructed to call for assistance. Frequent rounding completed. Will continue to monitor.   Eun Aguirre RN

## 2024-03-14 NOTE — PLAN OF CARE
Problem: Physical Therapy  Goal: Physical Therapy Goal  Description: Short term goals:  Pt will perform supine to sit with contact guard assistance  Pt will perform sit to stand with contact guard assistance  Pt will independently verbalize hip precautions  Pt will ambulate 100 ft with contact guard assistanceusing rolling walker    Long term goals:  Pt will perform supine to sit with modified independence  Pt will perform sit to stand with modified independence  Pt will ambulate 300 ft with modified independenceusing rolling walker    Outcome: Ongoing, Progressing

## 2024-03-14 NOTE — H&P
[facility] Per[facility] Services  Orthopedics  H&P    Patient Name: Radha Waldrop  MRN: [id number]  Admission Date: (Not on file)  Primary Care Provider: Mireya Barnes MD    Patient information was obtained from patient and ER records.     Subjective:     Principal Problem:Primary osteoarthritis of left hip    Chief Complaint: No chief complaint on file.       HPI: Chief complaint:  Left hip pain  History:Radha Waldrop is a 68 y.o. female seen for evaluation of left hip pain.  Symptoms began insidiously been escalating over the past 3-4 months period of time.  She has been experiencing groin and thigh pain worse with weight-bearing.  She was taken Tylenol for pain control.  She was not on any anti-inflammatory medications nor does she take any blood thinners.  She has not had a corticosteroid injection in her hip.  She has undergone right TKR performed elsewhere.  She lives alone and [place].  Her  is .    X-rays of the pelvis and both hips 2022 (outside films) two views AP and lateral projection were reviewed.  There is severe DJD of the left hip joint with obliteration of the joint space and marginal osteophyte formation.  There is moderate narrowing of the right hip joint space.  Impression:  Primary osteoarthritis-left hip   Plan:  Arnold robotic assisted left total hip replacement arthroplasty.  Procedure was shown in detail using models and literature was given.  The potential benefits and risks of surgery outlined to include but not limited to bleeding, infection, damage to blood vessels and nerves, need for further surgery, other risks and complications including even death the patient wished to proceed.  We discussed overnight stay in the hospital.        Past Medical History:   Diagnosis Date    Essential (primary) hypertension        Past Surgical History:   Procedure Laterality Date    KNEE SURGERY Right        Review of patient's allergies indicates:    Allergen Reactions    Ace inhibitors Swelling       No current facility-administered medications for this encounter.     Current Outpatient Medications   Medication Sig    acetaminophen (TYLENOL) 650 MG TbSR Take 650 mg by mouth every 8 (eight) hours.    aspirin (ECOTRIN) 81 MG EC tablet Take 81 mg by mouth once daily.    atenoloL-chlorthalidone (TENORETIC) 50-25 mg Tab Take 1 tablet by mouth once daily.    FLUoxetine 20 MG capsule Take 1 capsule (20 mg total) by mouth 2 (two) times daily.    meloxicam (MOBIC) 15 MG tablet Take 1 tablet (15 mg total) by mouth once daily.    omeprazole (PRILOSEC) 20 MG capsule Take 1 capsule (20 mg total) by mouth once daily.    potassium chloride (KLOR-CON) 10 MEQ TbSR Take 1 tablet (10 mEq total) by mouth once daily.    topiramate (TOPAMAX) 25 MG tablet Take 1 tablet (25 mg total) by mouth 2 (two) times daily.    traZODone (DESYREL) 50 MG tablet Take 1 tablet (50 mg total) by mouth every evening.    vitamin D (VITAMIN D3) 1000 units Tab Take 1 tablet (1,000 Units total) by mouth once daily.     Family History       Problem Relation (Age of Onset)    Diabetes Mother    Lung cancer Father          Tobacco Use    Smoking status: Never     Passive exposure: Never    Smokeless tobacco: Never   Substance and Sexual Activity    Alcohol use: Never    Drug use: Never    Sexual activity: Not Currently     Partners: Male     Review of Systems   Constitutional: Negative.     Objective:     Vital Signs (Most Recent):    Vital Signs (24h Range):  BP: ()/()   Arterial Line BP: ()/()            There is no height or weight on file to calculate BMI.    No intake or output data in the 24 hours ending 03/14/24 0641     General    Vitals reviewed.  Constitutional: She is oriented to person, place, and time. She appears well-developed and well-nourished.   HENT:   Head: Normocephalic and atraumatic.   Eyes: EOM are normal. Pupils are equal, round, and reactive to light.   Neck: Neck supple.    Cardiovascular:  Normal rate, regular rhythm and normal heart sounds.            Pulmonary/Chest: Effort normal and breath sounds normal.   Abdominal: Soft. Bowel sounds are normal.   Neurological: She is alert and oriented to person, place, and time.   Psychiatric: She has a normal mood and affect. Her behavior is normal.     General Musculoskeletal Exam   Gait: antalgic         Right Hip Exam   Right hip exam is normal.   Left Hip Exam     Range of Motion   Adduction:  abnormal              Significant Labs: All pertinent labs within the past 24 hours have been reviewed.    Significant Imaging: I have reviewed all pertinent imaging results/findings.  Assessment/Plan:     No notes have been filed under this hospital service.  Service: Orthopedic Surgery      Apolinar Blanco MD  Orthopedics  [facility]

## 2024-03-14 NOTE — ASSESSMENT & PLAN NOTE
Patient has prolonged history of arthritis and has had total knee replacement on her right knee in 2010.  - Robotic Arthoplasty of left hip performed by Dr Blanco.  - monitor s/s

## 2024-03-14 NOTE — OP NOTE
[facility]  Surgery Department  Operative Note    SUMMARY     Date of Procedure: 3/14/2024     Procedure: Procedure(s) (LRB):  ROBOTIC ARTHROPLASTY,HIP (Left)     Surgeon(s) and Role:     * Apolinar Blanco MD - Primary    Assisting Surgeon: None    Pre-Operative Diagnosis: Osteoarthritis of left hip, unspecified osteoarthritis type [M16.12]    Post-Operative Diagnosis: Post-Op Diagnosis Codes:     * Osteoarthritis of left hip, unspecified osteoarthritis type [M16.12]    Anesthesia:General    Operative Findings (including complications, if any):  Severe DJD left hip    Description of Technical Procedures:  Arnold robotic assisted left THR    Significant Surgical Tasks Conducted by the Assistant(s), if Applicable:  None  Estimated Blood Loss (EBL): 300 mL           Implants:   Implant Name Type Inv. Item Serial No.  Lot No. LRB No. Used Action   PIN BONE 4 X 140MM STERILE - [id number]  PIN BONE 4 X 140MM STERILE  [employer]  Left 1 Implanted and Explanted   PIN BONE 4 X 140MM STERILE - [id number]  PIN BONE 4 X 140MM STERILE  [employer]  Left 1 Implanted and Explanted   GUIDE WIRE [id number] BALL TIP - [id number]  GUIDE WIRE [id number] BALL TIP  [employer].  Left 1 Implanted and Explanted   SHELL TRIDENT II ACET E 52MM - [id number]  SHELL TRIDENT II ACET E 52MM  [employer]. [id number] Left 1 Implanted   SCREW TRIDENT II LP HEX 6.5X30 - [id number]  SCREW TRIDENT II LP HEX 6.5X30  [employer]. [id number] Left 1 Implanted   LINER ACET SZ E 42MM COCR - [id number]  LINER ACET SZ E 42MM COCR  [employer]. [id number] Left 1 Implanted   steven anato hip steam neutral     [id number] Left 1 Implanted   INSERT AMD/MDM X3 48MM - [id number]  INSERT AMD/MDM X3 48MM  [employer]. [id number] Left 1 Implanted   HEAD FEMORAL V40 +0X28MM COCR - [id number]  HEAD FEMORAL V40 +0X28MM COCR  [employer]. [id number] Left 1 Implanted       Specimens:    Specimen (24h ago, onward)       Start     Ordered    03/14/24 1111  Surgical Pathology  RELEASE UPON ORDERING         03/14/24 1111                            Condition: Good    Disposition: PACU - hemodynamically stable.    Attestation: I was present and scrubbed for the entire procedure.

## 2024-03-14 NOTE — ANESTHESIA PREPROCEDURE EVALUATION
03/14/2024  Radha Waldrop is a 68 y.o., female.      Pre-op Assessment    I have reviewed the Patient Summary Reports.     I have reviewed the Nursing Notes. I have reviewed the NPO Status.   I have reviewed the Medications.     Review of Systems  Anesthesia Hx:             Denies Family Hx of Anesthesia complications.    Denies Personal Hx of Anesthesia complications.                    Social:  Non-Smoker, No Alcohol Use       Hematology/Oncology:  Hematology Normal   Oncology Normal                                   EENT/Dental:  EENT/Dental Normal           Cardiovascular:     Hypertension                                        Pulmonary:  Pulmonary Normal                       Renal/:  Renal/ Normal                 Hepatic/GI:     GERD             Musculoskeletal:  Arthritis               Neurological:  Neurology Normal                                      Endocrine:  Endocrine Normal            Dermatological:  Skin Normal    Psych:  Psychiatric Normal                    Physical Exam  General: Well nourished, Cooperative, Alert and Oriented    Airway:  Mallampati: II / II  Mouth Opening: Normal  TM Distance: Normal  Neck ROM: Normal ROM    Dental:  Intact    Chest/Lungs:  Clear to auscultation    Heart:  Rate: Normal  Rhythm: Regular Rhythm  Sounds: Normal        Chemistry        Component Value Date/Time     02/27/2024 1055    K 3.8 02/27/2024 1055     (H) 02/27/2024 1055    CO2 24 02/27/2024 1055    BUN 30 (H) 02/27/2024 1055    CREATININE 1.23 (H) 02/27/2024 1055    GLU 75 02/27/2024 1055        Component Value Date/Time    CALCIUM 9.5 02/27/2024 1055    ALKPHOS 55 01/09/2024 1144    AST 18 01/09/2024 1144    ALT 14 01/09/2024 1144    BILITOT 0.4 01/09/2024 1144        Lab Results   Component Value Date    WBC 4.73 02/27/2024    HGB 10.7 (L) 02/27/2024    HCT 33.4 (L) 02/27/2024      02/27/2024     No results found for this or any previous visit.      Anesthesia Plan  Type of Anesthesia, risks & benefits discussed:    Anesthesia Type: Regional, Gen Supraglottic Airway, Spinal  Intra-op Monitoring Plan: Standard ASA Monitors  Post Op Pain Control Plan: multimodal analgesia and peripheral nerve block  Induction:  IV  Airway Plan: Direct  Informed Consent: Informed consent signed with the Patient and all parties understand the risks and agree with anesthesia plan.  All questions answered.   ASA Score: 2  Day of Surgery Review of History & Physical: H&P Update referred to the surgeon/provider.I have interviewed and examined the patient. I have reviewed the patient's H&P dated:     Ready For Surgery From Anesthesia Perspective.     .

## 2024-03-14 NOTE — SUBJECTIVE & OBJECTIVE
Past Medical History:   Diagnosis Date    Arthritis     Essential (primary) hypertension     Generalized anxiety disorder     Insomnia     Migraines        Past Surgical History:   Procedure Laterality Date    KNEE SURGERY Right        Review of patient's allergies indicates:   Allergen Reactions    Ace inhibitors Swelling       No current facility-administered medications on file prior to encounter.     Current Outpatient Medications on File Prior to Encounter   Medication Sig    meloxicam (MOBIC) 15 MG tablet Take 1 tablet (15 mg total) by mouth once daily.     Family History       Problem Relation (Age of Onset)    Diabetes Mother    Lung cancer Father          Tobacco Use    Smoking status: Never     Passive exposure: Never    Smokeless tobacco: Never   Substance and Sexual Activity    Alcohol use: Never    Drug use: Never    Sexual activity: Not Currently     Partners: Male     Review of Systems   Constitutional:  Negative for chills, diaphoresis, fatigue, fever and unexpected weight change.   HENT:  Negative for congestion, drooling, ear discharge, ear pain, postnasal drip, sinus pressure, sinus pain, sneezing and sore throat.    Eyes:  Negative for discharge, redness, itching and visual disturbance.   Cardiovascular:  Negative for chest pain, palpitations and leg swelling.   Gastrointestinal:  Negative for abdominal pain, constipation, diarrhea, nausea and vomiting.   Genitourinary:  Negative for difficulty urinating, dysuria, flank pain, frequency, hematuria and urgency.   Musculoskeletal:  Negative for arthralgias, back pain, joint swelling, myalgias, neck pain and neck stiffness.        Patient is s/p Robotic Arthoplasty of left hip.   Skin:  Negative for rash.   Neurological:  Negative for dizziness, syncope, weakness and headaches.   Psychiatric/Behavioral:  Negative for agitation, behavioral problems, confusion and decreased concentration.      Objective:     Vital Signs (Most Recent):  Temp: 98.6 °F  (37 °C) (03/14/24 1436)  Pulse: (!) 58 (03/14/24 1436)  Resp: 20 (03/14/24 1436)  BP: (!) 176/80 (03/14/24 1436)  SpO2: 96 % (03/14/24 1436) Vital Signs (24h Range):  Temp:  [97.5 °F (36.4 °C)-98.6 °F (37 °C)] 98.6 °F (37 °C)  Pulse:  [53-82] 58  Resp:  [6-20] 20  SpO2:  [95 %-100 %] 96 %  BP: ()/(29-93) 176/80     Weight: 69.4 kg (153 lb)  Body mass index is 29.88 kg/m².     Physical Exam  Vitals and nursing note reviewed.   Constitutional:       General: She is not in acute distress.     Appearance: Normal appearance. She is obese. She is not ill-appearing.   HENT:      Head: Normocephalic and atraumatic.      Nose: Nose normal.      Mouth/Throat:      Mouth: Mucous membranes are moist.      Pharynx: Oropharynx is clear.   Eyes:      Extraocular Movements: Extraocular movements intact.      Conjunctiva/sclera: Conjunctivae normal.      Pupils: Pupils are equal, round, and reactive to light.   Cardiovascular:      Rate and Rhythm: Normal rate and regular rhythm.      Pulses: Normal pulses.      Heart sounds: Normal heart sounds. No murmur heard.  Pulmonary:      Effort: Pulmonary effort is normal. No respiratory distress.      Breath sounds: Normal breath sounds. No wheezing.   Abdominal:      General: Bowel sounds are normal. There is no distension.      Palpations: Abdomen is soft.      Tenderness: There is no abdominal tenderness. There is no right CVA tenderness or left CVA tenderness.   Musculoskeletal:         General: Normal range of motion.      Cervical back: Normal range of motion and neck supple. No tenderness.      Right lower leg: No edema.      Left lower leg: No edema.        Legs:       Comments: S/p arthroplasty of left hip    Skin:     Capillary Refill: Capillary refill takes less than 2 seconds.   Neurological:      General: No focal deficit present.      Mental Status: She is alert and oriented to person, place, and time.   Psychiatric:         Mood and Affect: Mood normal.          Behavior: Behavior normal.         Thought Content: Thought content normal.         Judgment: Judgment normal.          Significant Labs: All pertinent labs within the past 24 hours have been reviewed.    Significant Imaging: I have reviewed all pertinent imaging results/findings within the past 24 hours.

## 2024-03-14 NOTE — ANESTHESIA PROCEDURE NOTES
Peripheral Block    Patient location during procedure: OR   Block not for primary anesthetic.  Reason for block: at surgeon's request and post-op pain management   Post-op Pain Location: left hip   Start time: 3/14/2024 10:10 AM  Timeout: 3/14/2024 10:10 AM   End time: 3/14/2024 10:12 AM    Staffing  Authorizing Provider: Pablo Bryson MD  Performing Provider: Pablo Bryson MD    Staffing  Performed by: Pablo Bryson MD  Authorized by: Pablo Bryson MD    Preanesthetic Checklist  Completed: patient identified, IV checked, site marked, risks and benefits discussed, surgical consent, monitors and equipment checked, pre-op evaluation and timeout performed  Peripheral Block  Patient position: supine  Prep: ChloraPrep  Patient monitoring: heart rate, cardiac monitor, continuous pulse ox, continuous capnometry and frequent blood pressure checks  Block type: fascia iliaca  Laterality: left  Injection technique: single shot  Needle  Needle type: Stimuplex   Needle gauge: 20 G  Needle length: 4 in  Needle localization: anatomical landmarks and ultrasound guidance   -ultrasound image captured on disc.  Assessment  Injection assessment: negative aspiration, local visualized surrounding nerve and negative parasthesia  Heart rate change: no  Slow fractionated injection: yes  Pain Tolerance: comfortable throughout block  Medications:    Medications: ROPIvacaine (NAROPIN) injection 0.75% - Perineural   30 mL - 3/14/2024 10:11:00 AM

## 2024-03-14 NOTE — ANESTHESIA PROCEDURE NOTES
Spinal    Diagnosis: total joint replacement  Patient location during procedure: OR  Start time: 3/14/2024 10:03 AM  Timeout: 3/14/2024 10:03 AM  End time: 3/14/2024 10:03 AM    Staffing  Authorizing Provider: Pablo Brysno MD  Performing Provider: Pablo Bryson MD    Staffing  Performed by: Pablo Bryson MD  Authorized by: Pablo Bryson MD    Preanesthetic Checklist  Completed: patient identified, IV checked, risks and benefits discussed, surgical consent, monitors and equipment checked, pre-op evaluation and timeout performed  Spinal Block  Patient position: sitting  Prep: Betadine  Patient monitoring: heart rate, continuous pulse ox, continuous capnometry and frequent blood pressure checks  Approach: midline  Location: L3-4  Injection technique: single shot  CSF Fluid: clear free-flowing CSF  Needle  Needle type: Quincke   Needle gauge: 22 G  Needle length: 4 in  Needle localization: anatomical landmarks  Assessment  Sensory level: T8   Dermatomal levels determined by alcohol wipe  Ease of block: easy  Patient's tolerance of the procedure: comfortable throughout block and no complaints

## 2024-03-14 NOTE — HPI
Pt is a 68 y.o. female s/p Robotic Arthoplasty of left hip for Primary osteoarthritis of left hip by Dr Apolniar Blanco. At time of exam, pt has no acute complaints or concerns. Pt  has a past medical history of Arthritis, Essential (primary) hypertension, Generalized anxiety disorder, Insomnia, and Migraines. Med Rec was completed at bedside. Current outpatient medications will be restarted as tolerated and are listed below:    Current Outpatient Medications   Medication Instructions    acetaminophen (TYLENOL) 650 mg, Oral, Every 8 hours    aspirin (ECOTRIN) 81 mg, Oral, Daily    atenoloL-chlorthalidone (TENORETIC) 50-25 mg Tab 1 tablet, Oral, Daily    FLUoxetine 20 mg, Oral, 2 times daily    meloxicam (MOBIC) 15 mg, Oral, Daily    omeprazole (PRILOSEC) 20 mg, Oral, Daily    potassium chloride (KLOR-CON) 10 MEQ TbSR 10 mEq, Oral, Daily    topiramate (TOPAMAX) 25 mg, Oral, 2 times daily    traZODone (DESYREL) 50 mg, Oral, Nightly    vitamin D (VITAMIN D3) 1,000 Units, Oral, Daily       This consult was performed under the direct supervision of Dr. Malachi Mckeon. Thank you for allowing the [facility] to be involved in the care of this patient.

## 2024-03-14 NOTE — HPI
Chief complaint:  Left hip pain  History:Radha Waldrop is a 68 y.o. female seen for evaluation of left hip pain.  Symptoms began insidiously been escalating over the past 3-4 months period of time.  She has been experiencing groin and thigh pain worse with weight-bearing.  She was taken Tylenol for pain control.  She was not on any anti-inflammatory medications nor does she take any blood thinners.  She has not had a corticosteroid injection in her hip.  She has undergone right TKR performed elsewhere.  She lives alone and [place].  Her  is .    X-rays of the pelvis and both hips 2022 (outside films) two views AP and lateral projection were reviewed.  There is severe DJD of the left hip joint with obliteration of the joint space and marginal osteophyte formation.  There is moderate narrowing of the right hip joint space.  Impression:  Primary osteoarthritis-left hip   Plan:  Arnold robotic assisted left total hip replacement arthroplasty.  Procedure was shown in detail using models and literature was given.  The potential benefits and risks of surgery outlined to include but not limited to bleeding, infection, damage to blood vessels and nerves, need for further surgery, other risks and complications including even death the patient wished to proceed.  We discussed overnight stay in the hospital.

## 2024-03-14 NOTE — PT/OT/SLP EVAL
Occupational Therapy   Evaluation    Name: Radha Waldrop  MRN: [id number]  Admitting Diagnosis: S/P total left hip arthroplasty  Recent Surgery: Procedure(s) (LRB):  ROBOTIC ARTHROPLASTY,HIP (Left) Day of Surgery    Recommendations:     Discharge Recommendations: Low Intensity Therapy  Discharge Equipment Recommendations:  walker, rolling  Barriers to discharge:  None    Assessment:     Radha Waldrop is a 68 y.o. female with a medical diagnosis of S/P total left hip arthroplasty.  She presents with s/p left THR on 3/14/24. Performance deficits affecting function: impaired self care skills, impaired functional mobility, gait instability, impaired balance, pain, orthopedic precautions.      Rehab Prognosis: Good; patient would benefit from acute skilled OT services to address these deficits and reach maximum level of function.       Plan:     Patient to be seen 5 x/week to address the above listed problems via self-care/home management, therapeutic activities, therapeutic exercises  Plan of Care Expires: 03/28/24  Plan of Care Reviewed with: patient    Subjective     Chief Complaint: s/p left THR  Patient/Family Comments/goals: pt agreeable to OT eval    Occupational Profile:  Living Environment: pt lives with son in one story home with no steps to enter  Previous level of function: independent with all ADL tasks, IADL tasks, and utilized cane for functional mobility   Roles and Routines: perform self care  Equipment Used at Home: cane, straight, shower chair  Assistance upon Discharge: home with home health    Pain/Comfort:  Pain Rating 1: 2/10  Location - Side 1: Left  Location 1: hip  Pain Addressed 1: Pre-medicate for activity    Patients cultural, spiritual, Yarsani conflicts given the current situation: no    Objective:     Communicated with: DOUGLAS Sanchez prior to session.  Patient found supine with blood pressure cuff, hemovac, hip abduction pillow, peripheral IV, pulse ox (continuous) upon OT entry  to room.    General Precautions: Standard, fall  Orthopedic Precautions: LLE partial weight bearing, LLE posterior precautions  Braces: N/A  Respiratory Status: Room air    Occupational Performance:    Bed Mobility:    Patient completed Supine to Sit with minimum assistance    Functional Mobility/Transfers:  Patient completed Sit <> Stand Transfer with minimum assistance  with  rolling walker   Patient completed Bed <> Chair Transfer using Step Transfer technique with minimum assistance with rolling walker  Patient completed Toilet Transfer Step Transfer technique with minimum assistance with  rolling walker  Functional Mobility: pt performed functional mobility to bathroom and back to chair with RW with CGA    Activities of Daily Living:  Lower Body Dressing: maximal assistance to jessy socks  Toileting: contact guard assistance to perform toilet hygiene    Cognitive/Visual Perceptual:  Cognitive/Psychosocial Skills:     -       Oriented to: Person, Place, Time, and Situation   -       Follows Commands/attention:Follows multistep  commands  -       Mood/Affect/Coping skills/emotional control: Cooperative    Physical Exam:  Balance:    -       sitting balance WFL; standing balance fair with RW  Upper Extremity Range of Motion:     -       Right Upper Extremity: WFL  -       Left Upper Extremity: WFL  Upper Extremity Strength:    -       Right Upper Extremity: WFL  -       Left Upper Extremity: WFL  Gross motor coordination:   WFL    AMPAC 6 Click ADL:  AMPAC Total Score: 22    Treatment & Education:  Pt educated on OT role/POC.   Importance of OOB activity with staff assistance.  Importance of sitting up in the chair throughout the day as tolerated, especially for meals   Safety during functional t/f and mobility with use of RW  Importance of assisting with self-care activities   All questions/concerns answered within OT scope of practice      Patient left up in chair with all lines intact, call button in reach,  Laura RN notified, and family present    GOALS:   Multidisciplinary Problems       Occupational Therapy Goals          Problem: Occupational Therapy    Goal Priority Disciplines Outcome Interventions   Occupational Therapy Goal     OT, PT/OT Ongoing, Progressing    Description: ST.Pt will perform bathing with [place] with setup at EOB  2.Pt will perform UE dressing with [place]  3.Pt will perform LE dressing with [place] with adaptive equipment  4.Pt will transfer bed/chair/bsc with CGA with RW  5.Pt will perform standing task x 2 min with CGA with RW  6.Tolerate 15 min of tx without fatigue.      LTG:   Restore to max I with selfcare and mobility.                           History:     Past Medical History:   Diagnosis Date    Arthritis     Essential (primary) hypertension     Generalized anxiety disorder     Insomnia     Migraines          Past Surgical History:   Procedure Laterality Date    knee replacement right Right     KNEE SURGERY Right        Time Tracking:     OT Date of Treatment: 24  OT Start Time: 1529  OT Stop Time: 1552  OT Total Time (min): 23 min    Billable Minutes:Evaluation OT min complexity eval    3/14/2024

## 2024-03-14 NOTE — OP NOTE
[facility]  Surgery Department  Operative Note    SUMMARY     Date of Procedure: 3/14/2024     Procedure: Procedure(s) (LRB):  ROBOTIC ARTHROPLASTY,HIP (Left)     Surgeon(s) and Role:     * Apolinar Blanco MD - Primary    Assisting Surgeon: None    Pre-Operative Diagnosis: Osteoarthritis of left hip, unspecified osteoarthritis type [M16.12]    Post-Operative Diagnosis: Post-Op Diagnosis Codes:     * Osteoarthritis of left hip, unspecified osteoarthritis type [M16.12]    Anesthesia: General    Technical Procedures Used: Arnold robotic assisted left THR           DEPARTMENT OF ORTHOPEDIC SURGERY                OPERATIVE REPORT     NAME:  Radha Waldrop  MRN: [id number]   DATE OF SURGERY:  3/14/2024      PREOPERATIVE DIAGNOSIS:  Osteoarthritis of left hip, unspecified osteoarthritis type [M16.12]       POSTOPERATIVE DIAGNOSIS: Osteoarthritis of left hip, unspecified osteoarthritis type [M16.12]       PROCEDURE:  ARNOLD robotic-assisted left total hip replacement arthroplasty      ANESTHESIA:  General      PROCEDURE IN DETAIL: The patient was taken to the operating room and placed in supine position.  After adequate level of general / spinal anesthesia had been achieved (see anesthesia note), the patient was placed in the left lateral decubitus position and held with a pelvic support.  The left hip and lower extremity was scrubbed and draped in sterile fashion.  The operation was begun by making 3 small stab wound incisions along the lateral crest of the left ilium for insertion of robot tracker pins x3.  The tracker array was attached to the protruding pins.  Incision was made along the lateral aspect of the proximal femur curving posteriorly.  Incision was carried through subcutaneous layers.  Skin flaps were developed.  Fascia vel and gluteal fascia were incised in line with skin incision.  The hip was internally rotated.  Dissection was carried down along the posterolateral  aspect of the hip joint.  The piriformis tendon insertion and the insertion of the short external rotator muscles was divided and retracted laterally protecting the sciatic nerve.  Hip joint capsule was opened in T fashion and tagged.  The retractor was attached to the greater trochanter of the proximal femur. A small stab wound was made along the lateral aspect of the distal femur.  A small drill hole was made in the lateral cortex of the femur.  The hip/femur length was registered with the MANUEL probe.  The hip was adducted and internally rotated.  Femoral head was disarticulated posteriorly.  Femoral neck cut was made with an oscillating saw.  The second checkpoint was implanted in the pelvis superior to the acetabular rim.  The acetabulum was registered with the MANUEL robotic probe.  Now reamer was inserted in the acetabulum adjusted and locked at 40 degrees of inclination 25 degrees of anteversion. The acetabulum was then reamed with robotic guidance to the appropriate depth, using a 52 mm outside diameter acetabular reamer. The reamer was removed.  Acetabulum was irrigated with antibiotic solution and dried.  A 52 mm outside diameter porous coated [place] Trident II tritanium cup was inserted with the help of the robot arm.  Good position and alignment was confirmed visually.  Robotic arm was removed.  Drill hole was made through 1 hole in the acetabular cup for insertion of a 6.5 mm diameter screw extending into the ilium.  A 42 mm inside diameter metallic MDM liner was then inserted and engaged with the cup.  Good stability was confirmed.  Attention was turned to the proximal femur.  Lateral portion of the femoral neck was removed with the box chisel.  The guide pin was inserted and advanced down the intramedullary canal of the femur.  Femur was reamed sequentially to a final diameter of 12 mm.  Remaining equipment was removed.  The canal was then broached sequentially to a final size 5 anato femoral  component.  Trial necks and a trial bipolar head were inserted and brought through a range of motion for assessment of leg lengths and stability.  A 42 mm outside diameter / 28 mm inside diameter +0 neck was determined to be best fit, stability, and leg length equalization.  The rial bipolar femoral head was removed.  Alcides bipolar MDM head 28 mm inside diameter / 42 mm outside diameter +0 neck length was engaged with the Donal taper of the femoral neck.  The hip was reduced and brought through a good and stable range of motion.  Leg lengths were equal.  The wounds were copiously irrigated with antibiotic solution.  The capsule was repaired with interrupted #1 Vicryl suture.  Checkpoints x2 removed.  Two Hemovac drains were placed in the depths of the wound and brought through a separate stab incision in the skin.  The piriformis tendon was repaired with interrupted #1 Vicryl suture.  The fascia vel and gluteal fascia were reapproximated with interrupted #1 Vicryl suture.  The subcutaneous tissue was approximated with interrupted 2-0 Vicryl suture.  Skin margins approximated with stainless steel staples.  Wounds were dressed sterilely.  Abduction pillow was applied.  The patient was awakened and taken to recovery room in satisfactory condition. ESTIMATED BLOOD LOSS:  300 ccs  The patient received Ancef and Vancomycin antibiotic intravenously prior to the procedure.        IMPLANTS:  [unfilled]      Apolinar Blanco MD               Description of the Findings of the Procedure:  Severe DJD-left hip    Significant Surgical Tasks Conducted by the Assistant(s), if Applicable:     Complications: No    Estimated Blood Loss (EBL): 300 mL           Implants:   Implant Name Type Inv. Item Serial No.  Lot No. LRB No. Used Action   PIN BONE 4 X 140MM STERILE - [id number]  PIN BONE 4 X 140MM STERILE  [facility]  Left 1 Implanted and Explanted   PIN BONE 4 X 140MM STERILE - [id number]  PIN BONE 4 X 140MM  STERILE  [employer]  Left 1 Implanted and Explanted   GUIDE WIRE [id number] BALL TIP - [id number]  GUIDE WIRE [id number] BALL TIP  [employer].  Left 1 Implanted and Explanted   SHELL TRIDENT II ACET E 52MM - [id number]  SHELL TRIDENT II ACET E 52MM  [employer]. [id number] Left 1 Implanted   SCREW TRIDENT II LP HEX 6.5X30 - [id number]  SCREW TRIDENT II LP HEX 6.5X30  [employer]. [id number] Left 1 Implanted   LINER ACET SZ E 42MM COCR - [id number]  LINER ACET SZ E 42MM COCR  [employer]. [id number] Left 1 Implanted   steven anato hip steam neutral     [id number] Left 1 Implanted   INSERT AMD/MDM X3 48MM - [id number]  INSERT AMD/MDM X3 48MM  [employer]. [id number] Left 1 Implanted   HEAD FEMORAL V40 +0X28MM COCR - [id number]  HEAD FEMORAL V40 +0X28MM COCR  [employer]. [id number] Left 1 Implanted       Specimens:   Specimen (24h ago, onward)       Start     Ordered    03/14/24 1111  Surgical Pathology  RELEASE UPON ORDERING         03/14/24 1111                            Condition: Good    Disposition: PACU - hemodynamically stable.    Attestation: I was present and scrubbed for the entire procedure.

## 2024-03-14 NOTE — PLAN OF CARE
Problem: Occupational Therapy  Goal: Occupational Therapy Goal  Description: ST.Pt will perform bathing with [place] with setup at EOB  2.Pt will perform UE dressing with [place]  3.Pt will perform LE dressing with [place] with adaptive equipment  4.Pt will transfer bed/chair/bsc with CGA with RW  5.Pt will perform standing task x 2 min with CGA with RW  6.Tolerate 15 min of tx without fatigue.      LTG:   Restore to max I with selfcare and mobility.      Outcome: Ongoing, Progressing

## 2024-03-14 NOTE — ASSESSMENT & PLAN NOTE
Chronic, controlled. Latest blood pressure and vitals reviewed-     Temp:  [97.5 °F (36.4 °C)-98.6 °F (37 °C)]   Pulse:  [53-82]   Resp:  [6-20]   BP: ()/(29-93)   SpO2:  [95 %-100 %] .   Home meds for hypertension were reviewed and noted below.   Hypertension Medications               atenoloL-chlorthalidone (TENORETIC) 50-25 mg Tab Take 1 tablet by mouth once daily.            While in the hospital, will manage blood pressure as follows; Continue home antihypertensive regimen    Will utilize p.r.n. blood pressure medication only if patient's blood pressure greater than 180/110 and she develops symptoms such as worsening chest pain or shortness of breath.  - continue bisoprolol-hydrochlorothiazide 5-6.25 mg per tablet 1 tablet  - potassium chloride SA CR tablet 10 mEq  - monitor BP   - Monitor BMP

## 2024-03-14 NOTE — TRANSFER OF CARE
Anesthesia Transfer of Care Note    Patient: Radha Waldrop    Procedure(s) Performed: Procedure(s) (LRB):  ROBOTIC ARTHROPLASTY,HIP (Left)    Patient location: PACU    Anesthesia Type: general, spinal and regional    Transport from OR: Transported from OR on room air with adequate spontaneous ventilation    Post pain: adequate analgesia    Post assessment: no apparent anesthetic complications    Post vital signs: stable    Level of consciousness: sedated    Nausea/Vomiting: no nausea/vomiting    Complications: none    Transfer of care protocol was followed      Last vitals: Visit Vitals  BP (!) 145/83   Pulse 60   Temp 36.4 °C (97.5 °F)   Resp (!) 6   Ht 5' (1.524 m)   Wt 69.4 kg (153 lb)   SpO2 95%   Breastfeeding No   BMI 29.88 kg/m²

## 2024-03-14 NOTE — SUBJECTIVE & OBJECTIVE
Past Medical History:   Diagnosis Date    Essential (primary) hypertension        Past Surgical History:   Procedure Laterality Date    KNEE SURGERY Right        Review of patient's allergies indicates:   Allergen Reactions    Ace inhibitors Swelling       No current facility-administered medications for this encounter.     Current Outpatient Medications   Medication Sig    acetaminophen (TYLENOL) 650 MG TbSR Take 650 mg by mouth every 8 (eight) hours.    aspirin (ECOTRIN) 81 MG EC tablet Take 81 mg by mouth once daily.    atenoloL-chlorthalidone (TENORETIC) 50-25 mg Tab Take 1 tablet by mouth once daily.    FLUoxetine 20 MG capsule Take 1 capsule (20 mg total) by mouth 2 (two) times daily.    meloxicam (MOBIC) 15 MG tablet Take 1 tablet (15 mg total) by mouth once daily.    omeprazole (PRILOSEC) 20 MG capsule Take 1 capsule (20 mg total) by mouth once daily.    potassium chloride (KLOR-CON) 10 MEQ TbSR Take 1 tablet (10 mEq total) by mouth once daily.    topiramate (TOPAMAX) 25 MG tablet Take 1 tablet (25 mg total) by mouth 2 (two) times daily.    traZODone (DESYREL) 50 MG tablet Take 1 tablet (50 mg total) by mouth every evening.    vitamin D (VITAMIN D3) 1000 units Tab Take 1 tablet (1,000 Units total) by mouth once daily.     Family History       Problem Relation (Age of Onset)    Diabetes Mother    Lung cancer Father          Tobacco Use    Smoking status: Never     Passive exposure: Never    Smokeless tobacco: Never   Substance and Sexual Activity    Alcohol use: Never    Drug use: Never    Sexual activity: Not Currently     Partners: Male     Review of Systems   Constitutional: Negative.     Objective:     Vital Signs (Most Recent):    Vital Signs (24h Range):  BP: ()/()   Arterial Line BP: ()/()            There is no height or weight on file to calculate BMI.    No intake or output data in the 24 hours ending 03/14/24 0641     General    Vitals reviewed.  Constitutional: She is oriented to person, place,  and time. She appears well-developed and well-nourished.   HENT:   Head: Normocephalic and atraumatic.   Eyes: EOM are normal. Pupils are equal, round, and reactive to light.   Neck: Neck supple.   Cardiovascular:  Normal rate, regular rhythm and normal heart sounds.            Pulmonary/Chest: Effort normal and breath sounds normal.   Abdominal: Soft. Bowel sounds are normal.   Neurological: She is alert and oriented to person, place, and time.   Psychiatric: She has a normal mood and affect. Her behavior is normal.     General Musculoskeletal Exam   Gait: antalgic         Right Hip Exam   Right hip exam is normal.   Left Hip Exam     Range of Motion   Adduction:  abnormal              Significant Labs: All pertinent labs within the past 24 hours have been reviewed.    Significant Imaging: I have reviewed all pertinent imaging results/findings.

## 2024-03-14 NOTE — OR NURSING
1222-Pt rec'd to PACU, stable condition, sedated, unresponsive to stimulation, NADN, O2@2L nc applied, SaO2-95%, resp even et unlabored, IV fluids ongoing, dressing C/D/I to left hip with hemovac intact/secure, abd pillow observed, left foot is cool to touch, cap refill < 3 secs, + dorsalis pedis pulse noted, SCD/ALCIDES hose to right leg, will con't to monitor, safety measures ongoing.    1235-Pt drowsy, responds to stimulation, unable to wiggle toes, c/o numbness, no c/o pain to left hip, will con't to monitor.    1257-X-ray done to left hip at the bedside.    1315-Pt asleep but easily aroused, NADN, no c/o pain, will con't to monitor.    1345-Pt able to wiggle toes but still c/o numbness, no c/o pain, also c/o being cold, warming blanket turned on.    1355-Pt awake/alert, NADN, no c/o pain, dressing remains C/D/I to left hip, orders to transfer to room 453.    1415-Pt care released to MessiRN), pt awake, vss oral temp 97.6, hr-58, resp-14, b/p 181/91, SaO2-97% on room air.

## 2024-03-15 VITALS
RESPIRATION RATE: 16 BRPM | WEIGHT: 153 LBS | OXYGEN SATURATION: 98 % | DIASTOLIC BLOOD PRESSURE: 64 MMHG | BODY MASS INDEX: 30.04 KG/M2 | HEART RATE: 61 BPM | TEMPERATURE: 97 F | SYSTOLIC BLOOD PRESSURE: 106 MMHG | HEIGHT: 60 IN

## 2024-03-15 PROBLEM — N17.9 AKI (ACUTE KIDNEY INJURY): Status: ACTIVE | Noted: 2024-03-15

## 2024-03-15 LAB
ALBUMIN SERPL BCP-MCNC: 3 G/DL (ref 3.5–5)
ALBUMIN/GLOB SERPL: 1 {RATIO}
ALP SERPL-CCNC: 47 U/L (ref 55–142)
ALT SERPL W P-5'-P-CCNC: 23 U/L (ref 13–56)
ANION GAP SERPL CALCULATED.3IONS-SCNC: 8 MMOL/L (ref 7–16)
ANISOCYTOSIS BLD QL SMEAR: ABNORMAL
AST SERPL W P-5'-P-CCNC: 27 U/L (ref 15–37)
BASOPHILS # BLD AUTO: 0.01 K/UL (ref 0–0.2)
BASOPHILS NFR BLD AUTO: 0.1 % (ref 0–1)
BILIRUB SERPL-MCNC: 0.4 MG/DL (ref ?–1.2)
BUN SERPL-MCNC: 21 MG/DL (ref 7–18)
BUN/CREAT SERPL: 15 (ref 6–20)
CALCIUM SERPL-MCNC: 9.5 MG/DL (ref 8.5–10.1)
CHLORIDE SERPL-SCNC: 107 MMOL/L (ref 98–107)
CO2 SERPL-SCNC: 26 MMOL/L (ref 21–32)
CREAT SERPL-MCNC: 1.4 MG/DL (ref 0.55–1.02)
DIFFERENTIAL METHOD BLD: ABNORMAL
EGFR (NO RACE VARIABLE) (RUSH/TITUS): 41 ML/MIN/1.73M2
EOSINOPHIL # BLD AUTO: 0 K/UL (ref 0–0.5)
EOSINOPHIL NFR BLD AUTO: 0 % (ref 1–4)
ERYTHROCYTE [DISTWIDTH] IN BLOOD BY AUTOMATED COUNT: 11 % (ref 11.5–14.5)
GLOBULIN SER-MCNC: 3 G/DL (ref 2–4)
GLUCOSE SERPL-MCNC: 137 MG/DL (ref 74–106)
HCT VFR BLD AUTO: 26.2 % (ref 38–47)
HGB BLD-MCNC: 8.3 G/DL (ref 12–16)
IMM GRANULOCYTES # BLD AUTO: 0.04 K/UL (ref 0–0.04)
IMM GRANULOCYTES NFR BLD: 0.5 % (ref 0–0.4)
INDIRECT COOMBS: NORMAL
LYMPHOCYTES # BLD AUTO: 0.61 K/UL (ref 1–4.8)
LYMPHOCYTES NFR BLD AUTO: 7.1 % (ref 27–41)
MACROCYTES BLD QL SMEAR: ABNORMAL
MCH RBC QN AUTO: 33.7 PG (ref 27–31)
MCHC RBC AUTO-ENTMCNC: 31.7 G/DL (ref 32–36)
MCV RBC AUTO: 106.5 FL (ref 80–96)
MONOCYTES # BLD AUTO: 0.67 K/UL (ref 0–0.8)
MONOCYTES NFR BLD AUTO: 7.8 % (ref 2–6)
MPC BLD CALC-MCNC: 10.9 FL (ref 9.4–12.4)
NEUTROPHILS # BLD AUTO: 7.23 K/UL (ref 1.8–7.7)
NEUTROPHILS NFR BLD AUTO: 84.5 % (ref 53–65)
NRBC # BLD AUTO: 0 X10E3/UL
NRBC, AUTO (.00): 0 %
PLATELET # BLD AUTO: 235 K/UL (ref 150–400)
PLATELET MORPHOLOGY: ABNORMAL
POTASSIUM SERPL-SCNC: 4.1 MMOL/L (ref 3.5–5.1)
PROT SERPL-MCNC: 6 G/DL (ref 6.4–8.2)
RBC # BLD AUTO: 2.46 M/UL (ref 4.2–5.4)
RH BLD: NORMAL
SODIUM SERPL-SCNC: 137 MMOL/L (ref 136–145)
SPECIMEN OUTDATE: NORMAL
WBC # BLD AUTO: 8.56 K/UL (ref 4.5–11)

## 2024-03-15 PROCEDURE — 97110 THERAPEUTIC EXERCISES: CPT

## 2024-03-15 PROCEDURE — 80053 COMPREHEN METABOLIC PANEL: CPT

## 2024-03-15 PROCEDURE — 99214 OFFICE O/P EST MOD 30 MIN: CPT | Mod: ,,, | Performed by: INTERNAL MEDICINE

## 2024-03-15 PROCEDURE — 97535 SELF CARE MNGMENT TRAINING: CPT

## 2024-03-15 PROCEDURE — 25000003 PHARM REV CODE 250

## 2024-03-15 PROCEDURE — 85025 COMPLETE CBC W/AUTO DIFF WBC: CPT

## 2024-03-15 PROCEDURE — 97116 GAIT TRAINING THERAPY: CPT

## 2024-03-15 PROCEDURE — 63600175 PHARM REV CODE 636 W HCPCS

## 2024-03-15 RX ORDER — HYDROCODONE BITARTRATE AND ACETAMINOPHEN 10; 325 MG/1; MG/1
1 TABLET ORAL EVERY 6 HOURS PRN
Qty: 28 TABLET | Refills: 0 | Status: SHIPPED | OUTPATIENT
Start: 2024-03-15 | End: 2024-03-22

## 2024-03-15 RX ORDER — HYDRALAZINE HYDROCHLORIDE 20 MG/ML
10 INJECTION INTRAMUSCULAR; INTRAVENOUS EVERY 6 HOURS PRN
Status: DISCONTINUED | OUTPATIENT
Start: 2024-03-15 | End: 2024-03-15 | Stop reason: HOSPADM

## 2024-03-15 RX ORDER — ATENOLOL 25 MG/1
50 TABLET ORAL DAILY
Status: DISCONTINUED | OUTPATIENT
Start: 2024-03-15 | End: 2024-03-15 | Stop reason: HOSPADM

## 2024-03-15 RX ADMIN — ASPIRIN 81 MG: 81 TABLET, COATED ORAL at 08:03

## 2024-03-15 RX ADMIN — HYDROCODONE BITARTRATE AND ACETAMINOPHEN 1 TABLET: 10; 325 TABLET ORAL at 08:03

## 2024-03-15 RX ADMIN — HYDROCODONE BITARTRATE AND ACETAMINOPHEN 1 TABLET: 10; 325 TABLET ORAL at 12:03

## 2024-03-15 RX ADMIN — POTASSIUM CHLORIDE 10 MEQ: 750 TABLET, EXTENDED RELEASE ORAL at 08:03

## 2024-03-15 RX ADMIN — CEFAZOLIN 2 G: 2 INJECTION, POWDER, FOR SOLUTION INTRAMUSCULAR; INTRAVENOUS at 02:03

## 2024-03-15 RX ADMIN — APIXABAN 2.5 MG: 2.5 TABLET, FILM COATED ORAL at 08:03

## 2024-03-15 RX ADMIN — Medication 1000 UNITS: at 08:03

## 2024-03-15 RX ADMIN — ATENOLOL 50 MG: 25 TABLET ORAL at 08:03

## 2024-03-15 RX ADMIN — TOPIRAMATE 25 MG: 25 TABLET, FILM COATED ORAL at 08:03

## 2024-03-15 RX ADMIN — HYDROCODONE BITARTRATE AND ACETAMINOPHEN 1 TABLET: 10; 325 TABLET ORAL at 02:03

## 2024-03-15 RX ADMIN — FLUOXETINE 20 MG: 20 CAPSULE ORAL at 08:03

## 2024-03-15 NOTE — ASSESSMENT & PLAN NOTE
- continue home medications  - Topiramate 25 mg BID  3/14/24  - patient symptoms are stable  - continue medications  - keep monitoring

## 2024-03-15 NOTE — ASSESSMENT & PLAN NOTE
Patient reported to have been diagnosed with insomnia few years ago. She is currently on Trazodone 50 mg QHS for sleep.  - continue home trazodone 50 mg QHS   3/14/24  - patient symptoms are stable  - continue medications  - keep monitoring

## 2024-03-15 NOTE — NURSING
Patient taken to bathroom by RN. As patient was exiting bathroom pt tripped which resulted in a fall to her bottom. Pt did not hit her head. Called on call ortho MD to notify of fall, with no response. Notify hospitalist on call. See orders. Pt transferred back to bed. No complaints of worsening pain. Call light in reach.

## 2024-03-15 NOTE — PROGRESS NOTES
[facility]  [facility]  Progress Note    Patient Name: Radha Waldrop  MRN: [id number]  Patient Class: OP- Outpatient Recovery   Admission Date: 3/14/2024  Length of Stay: 0 days  Attending Physician: Apolinar Blanco MD  Primary Care Provider: Mireya Barnes MD        Subjective:     Principal Problem:S/P total left hip arthroplasty        HPI:  Pt is a 68 y.o. female s/p Robotic Arthoplasty of left hip for Primary osteoarthritis of left hip by Dr Apolinar Blanco. At time of exam, pt has no acute complaints or concerns. Pt  has a past medical history of Arthritis, Essential (primary) hypertension, Generalized anxiety disorder, Insomnia, and Migraines. Med Rec was completed at bedside. Current outpatient medications will be restarted as tolerated and are listed below:    Current Outpatient Medications   Medication Instructions    acetaminophen (TYLENOL) 650 mg, Oral, Every 8 hours    aspirin (ECOTRIN) 81 mg, Oral, Daily    atenoloL-chlorthalidone (TENORETIC) 50-25 mg Tab 1 tablet, Oral, Daily    FLUoxetine 20 mg, Oral, 2 times daily    meloxicam (MOBIC) 15 mg, Oral, Daily    omeprazole (PRILOSEC) 20 mg, Oral, Daily    potassium chloride (KLOR-CON) 10 MEQ TbSR 10 mEq, Oral, Daily    topiramate (TOPAMAX) 25 mg, Oral, 2 times daily    traZODone (DESYREL) 50 mg, Oral, Nightly    vitamin D (VITAMIN D3) 1,000 Units, Oral, Daily       This consult was performed under the direct supervision of Dr. Malachi Mckeon. Thank you for allowing the [facility] to be involved in the care of this patient.         Overview/Hospital Course:  No notes on file    No new subjective & objective note has been filed under this hospital service since the last note was generated.      Assessment/Plan:      * S/P total left hip arthroplasty  Patient has prolonged history of arthritis and has had total knee replacement on her right knee in 2010.  - Robotic Arthoplasty of left hip performed by Dr Blanco.  -  monitor s/s  3/14/24  - patient symptoms are stable  - continue medications  - d/c fluids  - keep monitoring  - left hip x ray ordered as patient had a fall last night.      DAMASO (acute kidney injury)  Patient with acute kidney injury/acute renal failure. DAMASO is currently stable. Baseline creatinine unknown - Labs reviewed- Renal function/electrolytes with Estimated Creatinine Clearance: 33 mL/min (A) (based on SCr of 1.4 mg/dL (H)). according to latest data. Monitor urine output and serial BMP and adjust therapy as needed. Avoid nephrotoxins and renally dose meds for GFR listed above.  - monitor renal function  - discontinued bisoprolol HCTZ  - d/c meloxicam  - discontinued pantoprazole  - d/c fluids, will consider gentle rehydration  - monitor    Nonintractable migraine  - continue home medications  - Topiramate 25 mg BID  3/14/24  - patient symptoms are stable  - continue medications  - keep monitoring    Insomnia, unspecified  Patient reported to have been diagnosed with insomnia few years ago. She is currently on Trazodone 50 mg QHS for sleep.  - continue home trazodone 50 mg QHS   3/14/24  - patient symptoms are stable  - continue medications  - keep monitoring    Gastroesophageal reflux disease  - Pantoprazole 40 mg daily  3/14/24  - patient symptoms are stable  - continue medications  - keep monitoring    Vitamin D deficiency  Continue home medications  - vitamin D3 1000 units PO QD  3/14/24  - continue medications      Anxiety  Patient's symptoms are stable at the moment  - monitor s/s  - continue home medications  - Fluoxetine 20 mg BID  3/14/24  - patient symptoms are stable  - continue medications  - keep monitoring      Primary hypertension  Chronic, controlled. Latest blood pressure and vitals reviewed-     Temp:  [97.5 °F (36.4 °C)-98.6 °F (37 °C)]   Pulse:  [53-82]   Resp:  [6-20]   BP: ()/()   SpO2:  [95 %-100 %] .   Home meds for hypertension were reviewed and noted below.   Hypertension  Medications               atenoloL-chlorthalidone (TENORETIC) 50-25 mg Tab Take 1 tablet by mouth once daily.            While in the hospital, will manage blood pressure as follows; Continue home antihypertensive regimen    Will utilize p.r.n. blood pressure medication only if patient's blood pressure greater than 180/110 and she develops symptoms such as worsening chest pain or shortness of breath.  - continue bisoprolol-hydrochlorothiazide 5-6.25 mg per tablet 1 tablet  - potassium chloride SA CR tablet 10 mEq  - monitor BP   - Monitor BMP  3/14/24  - patient symptoms are stable  - continue medications  - keep monitoring      VTE Risk Mitigation (From admission, onward)           Ordered     apixaban tablet 2.5 mg  2 times daily         03/14/24 1425     IP VTE HIGH RISK PATIENT  Once         03/14/24 1425     Place ALCIDES hose  Until discontinued        Comments: ALCIDES to BLE    03/14/24 1425     Place sequential compression device  Until discontinued        Comments: BLE    03/14/24 1425     Place ALCIDES hose  Until discontinued         03/14/24 0829     Place sequential compression device  Until discontinued         03/14/24 0829                    Discharge Planning   DELICIA:      Code Status: Full Code   Is the patient medically ready for discharge?:     Reason for patient still in hospital (select all that apply): Pending disposition                     Kelechi Drummond MD  Department of Hospital Medicine   [facility]

## 2024-03-15 NOTE — ASSESSMENT & PLAN NOTE
Chronic, controlled. Latest blood pressure and vitals reviewed-     Temp:  [97.5 °F (36.4 °C)-98.6 °F (37 °C)]   Pulse:  [53-82]   Resp:  [6-20]   BP: ()/()   SpO2:  [95 %-100 %] .   Home meds for hypertension were reviewed and noted below.   Hypertension Medications               atenoloL-chlorthalidone (TENORETIC) 50-25 mg Tab Take 1 tablet by mouth once daily.            While in the hospital, will manage blood pressure as follows; Continue home antihypertensive regimen    Will utilize p.r.n. blood pressure medication only if patient's blood pressure greater than 180/110 and she develops symptoms such as worsening chest pain or shortness of breath.  - continue bisoprolol-hydrochlorothiazide 5-6.25 mg per tablet 1 tablet  - potassium chloride SA CR tablet 10 mEq  - monitor BP   - Monitor BMP  3/14/24  - patient symptoms are stable  - continue medications  - keep monitoring

## 2024-03-15 NOTE — PLAN OF CARE
[facility]  Initial Discharge Assessment       Primary Care Provider: Mireya Barnes MD    Admission Diagnosis: Osteoarthritis of left hip, unspecified osteoarthritis type [M16.12]  Primary osteoarthritis of left hip [M16.12]    Admission Date: 3/14/2024  Expected Discharge Date:     Transition of Care Barriers: None    Payor: [employer] / Plan: [employer] MEDICARE HMO / Product Type: Medicare Advantage /     Extended Emergency Contact Information  Primary Emergency Contact: JOSE MIGUEL Waldrop  Home Phone: [phone]  Relation: Spouse  Preferred language: English   needed? No    Discharge Plan A: Home, Home Health  Discharge Plan B: Home, Home Health      [facility] [place] - [address] [place] [address]  Phone: [phone] Fax: [phone]    [facility] [address] [place] [address]  [address] [place] [address]  Phone: [phone] Fax: [phone]      Initial Assessment (most recent)       Adult Discharge Assessment - 03/15/24 0954          Discharge Assessment    Assessment Type Discharge Planning Assessment     Source of Information patient     Communicated DELICIA with patient/caregiver Yes     People in Home child(romero), adult     Do you expect to return to your current living situation? Yes     Do you have help at home or someone to help you manage your care at home? No   son lives with patient but he has medical issues also. patients sister lives a few miles away from patient    Prior to hospitilization cognitive status: Alert/Oriented     Current cognitive status: Alert/Oriented     Walking or Climbing Stairs Difficulty no     Dressing/Bathing Difficulty no     Equipment Currently Used at Home cane, straight;shower chair;other (see comments)   has bedside commode in storage building    Patient currently being followed by outpatient case management? No     Do you currently have service(s) that help you manage your care at  home? No     Do you take prescription medications? Yes     Do you have any problems affording any of your prescribed medications? No     Is the patient taking medications as prescribed? yes     Who is going to help you get home at discharge? family     How do you get to doctors appointments? car, drives self;family or friend will provide     Are you on dialysis? No     Do you take coumadin? No     Discharge Plan A Home;Home Health     Discharge Plan B Home;Home Health     DME Needed Upon Discharge  walker, rolling     Discharge Plan discussed with: Patient     Transition of Care Barriers None                   Consult for dc planning after routine ortho surgery. Spoke with patient in her room. Her son Gael lives with her. She states that he has medical issues but will be there to help as he can. Sister Smiley will also help with care. Verbal choice for [facility] home health. Will fax and notify Justine. Will obtain rolling walker from [facility]. Faxed and called Aleena. Dc plan is home with home health. Cm will follow.

## 2024-03-15 NOTE — ASSESSMENT & PLAN NOTE
Patient has prolonged history of arthritis and has had total knee replacement on her right knee in 2010.  - Robotic Arthoplasty of left hip performed by Dr Blanco.  - monitor s/s  3/14/24  - patient symptoms are stable  - continue medications  - d/c fluids  - keep monitoring  - left hip x ray ordered as patient had a fall last night.

## 2024-03-15 NOTE — ASSESSMENT & PLAN NOTE
- Pantoprazole 40 mg daily  3/14/24  - patient symptoms are stable  - continue medications  - keep monitoring

## 2024-03-15 NOTE — SUBJECTIVE & OBJECTIVE
Interval History: Patient was lying in the bed comfortably at the time of encounter. She didn't complain of any overnight events.    Review of Systems   Constitutional:  Negative for chills, diaphoresis, fatigue, fever and unexpected weight change.   HENT:  Negative for congestion, drooling, ear discharge, ear pain, postnasal drip, sinus pressure, sinus pain, sneezing and sore throat.    Eyes:  Negative for discharge, redness, itching and visual disturbance.   Cardiovascular:  Negative for chest pain, palpitations and leg swelling.   Gastrointestinal:  Negative for abdominal pain, constipation, diarrhea, nausea and vomiting.   Genitourinary:  Negative for difficulty urinating, dysuria, flank pain, frequency, hematuria and urgency.   Musculoskeletal:  Negative for arthralgias, back pain, joint swelling, myalgias, neck pain and neck stiffness.        Patient is s/p Robotic Arthoplasty of left hip.   Skin:  Negative for rash.   Neurological:  Negative for dizziness, syncope, weakness and headaches.   Psychiatric/Behavioral:  Negative for agitation, behavioral problems, confusion and decreased concentration.      Objective:     Vital Signs (Most Recent):  Temp: 97.9 °F (36.6 °C) (03/15/24 0222)  Pulse: 60 (03/15/24 0555)  Resp: 18 (03/15/24 0214)  BP: (!) 165/82 (03/15/24 0555)  SpO2: 97 % (03/15/24 0555) Vital Signs (24h Range):  Temp:  [97.5 °F (36.4 °C)-98.6 °F (37 °C)] 97.9 °F (36.6 °C)  Pulse:  [53-82] 60  Resp:  [6-20] 18  SpO2:  [95 %-100 %] 97 %  BP: ()/() 165/82     Weight: 69.4 kg (153 lb)  Body mass index is 29.88 kg/m².    Intake/Output Summary (Last 24 hours) at 3/15/2024 0605  Last data filed at 3/14/2024 1935  Gross per 24 hour   Intake 350 ml   Output 385 ml   Net -35 ml         Physical Exam  Vitals and nursing note reviewed.   Constitutional:       General: She is not in acute distress.     Appearance: Normal appearance. She is obese. She is not ill-appearing.   HENT:      Head:  Normocephalic and atraumatic.      Nose: Nose normal.      Mouth/Throat:      Mouth: Mucous membranes are moist.      Pharynx: Oropharynx is clear.   Eyes:      Extraocular Movements: Extraocular movements intact.      Conjunctiva/sclera: Conjunctivae normal.      Pupils: Pupils are equal, round, and reactive to light.   Cardiovascular:      Rate and Rhythm: Normal rate and regular rhythm.      Pulses: Normal pulses.      Heart sounds: Normal heart sounds. No murmur heard.  Pulmonary:      Effort: Pulmonary effort is normal. No respiratory distress.      Breath sounds: Normal breath sounds. No wheezing.   Abdominal:      General: Bowel sounds are normal. There is no distension.      Palpations: Abdomen is soft.      Tenderness: There is no abdominal tenderness. There is no right CVA tenderness or left CVA tenderness.   Musculoskeletal:         General: Normal range of motion.      Cervical back: Normal range of motion and neck supple. No tenderness.      Right lower leg: No edema.      Left lower leg: No edema.        Legs:       Comments: S/p arthroplasty of left hip    Skin:     Capillary Refill: Capillary refill takes less than 2 seconds.   Neurological:      General: No focal deficit present.      Mental Status: She is alert and oriented to person, place, and time.   Psychiatric:         Mood and Affect: Mood normal.         Behavior: Behavior normal.         Thought Content: Thought content normal.         Judgment: Judgment normal.             Significant Labs: All pertinent labs within the past 24 hours have been reviewed.    Significant Imaging: I have reviewed all pertinent imaging results/findings within the past 24 hours.

## 2024-03-15 NOTE — PT/OT/SLP PROGRESS
Occupational Therapy   Treatment    Name: Radha Waldrop  MRN: [id number]  Admitting Diagnosis:  S/P total left hip arthroplasty  1 Day Post-Op    Recommendations:     Discharge Recommendations: Low Intensity Therapy  Discharge Equipment Recommendations:  walker, rolling  Barriers to discharge:  None    Assessment:     Radha Waldrop is a 68 y.o. female with a medical diagnosis of S/P total left hip arthroplasty.  She presents with s/p left THR on 3/14/24. Performance deficits affecting function are impaired self care skills, impaired functional mobility, gait instability, impaired balance, pain, orthopedic precautions.     Rehab Prognosis:  Good; patient would benefit from acute skilled OT services to address these deficits and reach maximum level of function.       Plan:     Patient to be seen 5 x/week to address the above listed problems via self-care/home management, therapeutic activities, therapeutic exercises  Plan of Care Expires: 03/28/24  Plan of Care Reviewed with: patient    Subjective     Chief Complaint: s/p left THR  Patient/Family Comments/goals: pt agreeable to OT tx  Pain/Comfort:  Pain Rating 1: 4/10  Location - Side 1: Left  Location 1: hip  Pain Addressed 1: Pre-medicate for activity    Objective:     Communicated with: DOUGLAS Lusi prior to session.  Patient found up in chair with hemovac, peripheral IV upon OT entry to room.    General Precautions: Standard, fall    Orthopedic Precautions:LLE partial weight bearing, LLE posterior precautions  Braces: N/A  Respiratory Status: Room air     Occupational Performance:     Bed Mobility:    Not performed     Functional Mobility/Transfers:  Patient completed Sit <> Stand Transfer with contact guard assistance  with  rolling walker   Functional Mobility: not performed    Activities of Daily Living:  Bathing: stand by assistance to perform upper body bathing, trunk, and perineal area  Upper Body Dressing: modified independence to jessy dress  Lower  Body Dressing: stand by assistance to jessy underwear with adaptive equipment      [facility] 6 Click ADL:      Treatment & Education:  Pt performed ADL training as listed above.     Patient left up in chair with all lines intact and call button in reach    GOALS:   Multidisciplinary Problems       Occupational Therapy Goals          Problem: Occupational Therapy    Goal Priority Disciplines Outcome Interventions   Occupational Therapy Goal     OT, PT/OT Ongoing, Progressing    Description: ST.Pt will perform bathing with [place] with setup at EOB  2.Pt will perform UE dressing with [place]  3.Pt will perform LE dressing with [place] with adaptive equipment  4.Pt will transfer bed/chair/bsc with CGA with RW  5.Pt will perform standing task x 2 min with CGA with RW  6.Tolerate 15 min of tx without fatigue.      LTG:   Restore to max I with selfcare and mobility.                           Time Tracking:     OT Date of Treatment: 03/15/24  OT Start Time: 939  OT Stop Time: 100  OT Total Time (min): 25 min    Billable Minutes:Self Care/Home Management 25 minutes    OT/RHYS: OT          3/15/2024

## 2024-03-15 NOTE — ASSESSMENT & PLAN NOTE
Patient with acute kidney injury/acute renal failure. DAMASO is currently stable. Baseline creatinine unknown - Labs reviewed- Renal function/electrolytes with Estimated Creatinine Clearance: 33 mL/min (A) (based on SCr of 1.4 mg/dL (H)). according to latest data. Monitor urine output and serial BMP and adjust therapy as needed. Avoid nephrotoxins and renally dose meds for GFR listed above.  - monitor renal function  - discontinued bisoprolol HCTZ  - d/c meloxicam  - discontinued pantoprazole  - d/c fluids, will consider gentle rehydration  - monitor

## 2024-03-15 NOTE — PROGRESS NOTES
[facility]  [facility] Medicine  Progress Note    Patient Name: Radha Waldrop  MRN: [id number]  Patient Class: OP- Outpatient Recovery   Admission Date: 3/14/2024  Length of Stay: 0 days  Attending Physician: Apolinar Blanco MD  Primary Care Provider: Mireya Barnes MD        Subjective:     Principal Problem:S/P total left hip arthroplasty        HPI:  Pt is a 68 y.o. female s/p Robotic Arthoplasty of left hip for Primary osteoarthritis of left hip by Dr Apolinar Blanco. At time of exam, pt has no acute complaints or concerns. Pt  has a past medical history of Arthritis, Essential (primary) hypertension, Generalized anxiety disorder, Insomnia, and Migraines. Med Rec was completed at bedside. Current outpatient medications will be restarted as tolerated and are listed below:    Current Outpatient Medications   Medication Instructions    acetaminophen (TYLENOL) 650 mg, Oral, Every 8 hours    aspirin (ECOTRIN) 81 mg, Oral, Daily    atenoloL-chlorthalidone (TENORETIC) 50-25 mg Tab 1 tablet, Oral, Daily    FLUoxetine 20 mg, Oral, 2 times daily    meloxicam (MOBIC) 15 mg, Oral, Daily    omeprazole (PRILOSEC) 20 mg, Oral, Daily    potassium chloride (KLOR-CON) 10 MEQ TbSR 10 mEq, Oral, Daily    topiramate (TOPAMAX) 25 mg, Oral, 2 times daily    traZODone (DESYREL) 50 mg, Oral, Nightly    vitamin D (VITAMIN D3) 1,000 Units, Oral, Daily       This consult was performed under the direct supervision of Dr. Malachi Mckeon. Thank you for allowing the [facility] to be involved in the care of this patient.         Overview/Hospital Course:  No notes on file    Interval History: Patient was lying in the bed comfortably at the time of encounter. Patient tripped and fell on her back at night while going to the bathroom. She didn't report any dizziness, LOC, or head impact at the time of fall. She didn't report loss of conscious after fall. She didn't complain of any pain the fall site.     Review of  Systems   Constitutional:  Negative for chills, diaphoresis, fatigue, fever and unexpected weight change.   HENT:  Negative for congestion, drooling, ear discharge, ear pain, postnasal drip, sinus pressure, sinus pain, sneezing and sore throat.    Eyes:  Negative for discharge, redness, itching and visual disturbance.   Cardiovascular:  Negative for chest pain, palpitations and leg swelling.   Gastrointestinal:  Negative for abdominal pain, constipation, diarrhea, nausea and vomiting.   Genitourinary:  Negative for difficulty urinating, dysuria, flank pain, frequency, hematuria and urgency.   Musculoskeletal:  Negative for arthralgias, back pain, joint swelling, myalgias, neck pain and neck stiffness.        Patient is s/p Robotic Arthoplasty of left hip.   Skin:  Negative for rash.   Neurological:  Negative for dizziness, syncope, weakness and headaches.   Psychiatric/Behavioral:  Negative for agitation, behavioral problems, confusion and decreased concentration.      Objective:     Vital Signs (Most Recent):  Temp: 97.9 °F (36.6 °C) (03/15/24 0222)  Pulse: 60 (03/15/24 0555)  Resp: 18 (03/15/24 0214)  BP: (!) 165/82 (03/15/24 0555)  SpO2: 97 % (03/15/24 0555) Vital Signs (24h Range):  Temp:  [97.5 °F (36.4 °C)-98.6 °F (37 °C)] 97.9 °F (36.6 °C)  Pulse:  [53-82] 60  Resp:  [6-20] 18  SpO2:  [95 %-100 %] 97 %  BP: ()/() 165/82     Weight: 69.4 kg (153 lb)  Body mass index is 29.88 kg/m².    Intake/Output Summary (Last 24 hours) at 3/15/2024 0605  Last data filed at 3/14/2024 1935  Gross per 24 hour   Intake 350 ml   Output 385 ml   Net -35 ml         Physical Exam  Vitals and nursing note reviewed.   Constitutional:       General: She is not in acute distress.     Appearance: Normal appearance. She is obese. She is not ill-appearing.   HENT:      Head: Normocephalic and atraumatic.      Nose: Nose normal.      Mouth/Throat:      Mouth: Mucous membranes are moist.      Pharynx: Oropharynx is clear.    Eyes:      Extraocular Movements: Extraocular movements intact.      Conjunctiva/sclera: Conjunctivae normal.      Pupils: Pupils are equal, round, and reactive to light.   Cardiovascular:      Rate and Rhythm: Normal rate and regular rhythm.      Pulses: Normal pulses.      Heart sounds: Normal heart sounds. No murmur heard.  Pulmonary:      Effort: Pulmonary effort is normal. No respiratory distress.      Breath sounds: Normal breath sounds. No wheezing.   Abdominal:      General: Bowel sounds are normal. There is no distension.      Palpations: Abdomen is soft.      Tenderness: There is no abdominal tenderness. There is no right CVA tenderness or left CVA tenderness.   Musculoskeletal:         General: Normal range of motion.      Cervical back: Normal range of motion and neck supple. No tenderness.      Right lower leg: No edema.      Left lower leg: No edema.        Legs:       Comments: S/p arthroplasty of left hip    Skin:     Capillary Refill: Capillary refill takes less than 2 seconds.   Neurological:      General: No focal deficit present.      Mental Status: She is alert and oriented to person, place, and time.   Psychiatric:         Mood and Affect: Mood normal.         Behavior: Behavior normal.         Thought Content: Thought content normal.         Judgment: Judgment normal.             Significant Labs: All pertinent labs within the past 24 hours have been reviewed.    Significant Imaging: I have reviewed all pertinent imaging results/findings within the past 24 hours.    Assessment/Plan:      * S/P total left hip arthroplasty  Patient has prolonged history of arthritis and has had total knee replacement on her right knee in 2010.  - Robotic Arthoplasty of left hip performed by Dr Blanco.  - monitor s/s  3/14/24  - patient symptoms are stable  - continue medications  - d/c fluids  - keep monitoring      DAMASO (acute kidney injury)  Patient with acute kidney injury/acute renal failure. DAMASO is  currently stable. Baseline creatinine unknown - Labs reviewed- Renal function/electrolytes with Estimated Creatinine Clearance: 33 mL/min (A) (based on SCr of 1.4 mg/dL (H)). according to latest data. Monitor urine output and serial BMP and adjust therapy as needed. Avoid nephrotoxins and renally dose meds for GFR listed above.  - monitor renal function  - discontinued bisoprolol HCTZ  - d/c meloxicam  - discontinued pantoprazole  - d/c fluids, will consider gentle rehydration  - monitor    Nonintractable migraine  - continue home medications  - Topiramate 25 mg BID  3/14/24  - patient symptoms are stable  - continue medications  - keep monitoring    Insomnia, unspecified  Patient reported to have been diagnosed with insomnia few years ago. She is currently on Trazodone 50 mg QHS for sleep.  - continue home trazodone 50 mg QHS   3/14/24  - patient symptoms are stable  - continue medications  - keep monitoring    Gastroesophageal reflux disease  - Pantoprazole 40 mg daily  3/14/24  - patient symptoms are stable  - continue medications  - keep monitoring    Vitamin D deficiency  Continue home medications  - vitamin D3 1000 units PO QD  3/14/24  - continue medications      Anxiety  Patient's symptoms are stable at the moment  - monitor s/s  - continue home medications  - Fluoxetine 20 mg BID  3/14/24  - patient symptoms are stable  - continue medications  - keep monitoring      Primary hypertension  Chronic, controlled. Latest blood pressure and vitals reviewed-     Temp:  [97.5 °F (36.4 °C)-98.6 °F (37 °C)]   Pulse:  [53-82]   Resp:  [6-20]   BP: ()/()   SpO2:  [95 %-100 %] .   Home meds for hypertension were reviewed and noted below.   Hypertension Medications               atenoloL-chlorthalidone (TENORETIC) 50-25 mg Tab Take 1 tablet by mouth once daily.            While in the hospital, will manage blood pressure as follows; Continue home antihypertensive regimen    Will utilize p.r.n. blood pressure  medication only if patient's blood pressure greater than 180/110 and she develops symptoms such as worsening chest pain or shortness of breath.  - continue bisoprolol-hydrochlorothiazide 5-6.25 mg per tablet 1 tablet  - potassium chloride SA CR tablet 10 mEq  - monitor BP   - Monitor BMP  3/14/24  - patient symptoms are stable  - continue medications  - keep monitoring      VTE Risk Mitigation (From admission, onward)           Ordered     apixaban tablet 2.5 mg  2 times daily         03/14/24 1425     IP VTE HIGH RISK PATIENT  Once         03/14/24 1425     Place ALCIDES hose  Until discontinued        Comments: ALCIDES to BLE    03/14/24 1425     Place sequential compression device  Until discontinued        Comments: BLE    03/14/24 1425     Place ALCIDES hose  Until discontinued         03/14/24 0829     Place sequential compression device  Until discontinued         03/14/24 0829                    Discharge Planning   DELICIA:      Code Status: Full Code   Is the patient medically ready for discharge?:     Reason for patient still in hospital (select all that apply): Treatment                     Romy Villegas MD  Department of Hospital Medicine   [facility]

## 2024-03-15 NOTE — PT/OT/SLP PROGRESS
Physical Therapy Treatment    Patient Name:  Radha Waldrop   MRN:  [id number]    Recommendations:     Discharge Recommendations: Low Intensity Therapy  Discharge Equipment Recommendations: walker, rolling  Barriers to discharge: None    Assessment:     Radha Waldrop is a 68 y.o. female admitted with a medical diagnosis of S/P total left hip arthroplasty.  She presents with the following impairments/functional limitations: weakness, impaired functional mobility, gait instability, impaired balance, decreased lower extremity function, pain, orthopedic precautions. Pt did well with treatment today. Pt did have a fall this morning with nursing when trying to go to [facility]. Some generalized soreness present. However, Pt presented with no issues with exercises and ambulation. Pt will have assistance from her son at d/c.  Should be okay to return home    Rehab Prognosis: Good; patient would benefit from acute skilled PT services to address these deficits and reach maximum level of function.    Recent Surgery: Procedure(s) (LRB):  ROBOTIC ARTHROPLASTY,HIP (Left) 1 Day Post-Op    Plan:     During this hospitalization, patient to be seen BID to address the identified rehab impairments via gait training, therapeutic activities, therapeutic exercises and progress toward the following goals:    Plan of Care Expires:  04/14/24    Subjective     Chief Complaint: weakness  Patient/Family Comments/goals: pt is agreeable to therapy   Pain/Comfort:  Pain Rating 1: 2/10  Location - Side 1: Left  Location 1: hip  Pain Rating Post-Intervention 1: 2/10      Objective:     Communicated with ADRIANO Syed RN prior to session.  Patient found HOB elevated with blood pressure cuff, hemovac, hip abduction pillow, pulse ox (continuous) upon PT entry to room.     General Precautions: Standard, fall  Orthopedic Precautions: LLE partial weight bearing, LLE posterior precautions  Braces: N/A  Respiratory Status: Room air     Functional Mobility:  Bed  Mobility:     Rolling Left:  stand by assistance  Supine to Sit: contact guard assistance  Transfers:     Sit to Stand:  contact guard assistance with rolling walker  Gait: pt ambulated for 70ft with rolling walker with contact guard assistance   Balance: good      AM-PAC 6 CLICK MOBILITY  Turning over in bed (including adjusting bedclothes, sheets and blankets)?: 3  Sitting down on and standing up from a chair with arms (e.g., wheelchair, bedside commode, etc.): 3  Moving from lying on back to sitting on the side of the bed?: 3  Moving to and from a bed to a chair (including a wheelchair)?: 3  Need to walk in hospital room?: 3  Climbing 3-5 steps with a railing?: 3  Basic Mobility Total Score: 18       Treatment & Education:  Pt performed the following LLE bed level therapeutic exercises: ankle pumps, hip abduction, and SAQ X 30. Pt required stand-by assistance for rolling in the bed and contact guard assistance for supine to sit. Pt performed assisted LAQ at edge of bed X 30. Pt ambulated for 70ft with rolling walker and contact guard assistance.     Patient left up in chair with all lines intact and call button in reach..    GOALS:   Multidisciplinary Problems       Physical Therapy Goals          Problem: Physical Therapy    Goal Priority Disciplines Outcome Goal Variances Interventions   Physical Therapy Goal     PT, PT/OT Ongoing, Progressing     Description: Short term goals:  Pt will perform supine to sit with contact guard assistance  Pt will perform sit to stand with contact guard assistance  Pt will independently verbalize hip precautions  Pt will ambulate 100 ft with contact guard assistanceusing rolling walker    Long term goals:  Pt will perform supine to sit with modified independence  Pt will perform sit to stand with modified independence  Pt will ambulate 300 ft with modified independenceusing rolling walker                         Time Tracking:     PT Received On: 03/15/24  PT Start Time: 0859      PT Stop Time: 0928  PT Total Time (min): 29 min     Billable Minutes: Gait Training 10 and Therapeutic Exercise 19    Treatment Type: Treatment  PT/PTA: PT     Number of PTA visits since last PT visit: 0     03/15/2024

## 2024-03-15 NOTE — DISCHARGE SUMMARY
Department of Orthopedic Surgery  Discharge Note    Admit Date: 3/14/2024  Discharge Date and Time: 3/15/2024   Attending Physician: Apolinar Blanco MD   Discharge Provider: Omaira Isidro    Pre-op Diagnosis: Osteoarthritis of left hip, unspecified osteoarthritis type [M16.12]  Primary osteoarthritis of left hip [M16.12]  Procedure: No surgery found  Final Diagnosis:     Disposition: Home-Health Care [facility]  Discharged Condition: stable    Hospital Course:   Patient came in on 3/14/2024 and underwent left hip arthroplasty without complications. POD1 patient sitting up resting in bed comfortably.  She reports a fall earlier today when going to the bathroom the nurse was with her at time of fall, she states that she fell backwards onto her bottom.  X-ray this morning after fall showed proper alignment of hardware.  Okay to discharge home today with home health.  Discussed continuation of Eliquis for 30 days postoperatively.  Drain can be discontinued tomorrow with home health.  Did discuss wearing ALCIDES hose for 4 weeks, okay to remove twice daily for an hour each time.  We will prescribe pain medicine today,  reviewed.  Discussed follow up with me in 2 weeks.    Physical Exam:  Left hip with surgical dressing clean dry and intact, good range of motion with dorsiflexion and plantar flexion of her foot, neurovascularly intact  Surgical incision is clean,dry,intact with minimal/expected erythema and swelling    Discharge Instructions:   Discharge Procedure Orders (must include Diet, Follow-up, Activity)   Diet general     Change dressing (specify)   Order Comments: Home Health / Swingbed nurse:  Change dressing post op day #3. Clean with Chloraprep, apply Aquacel AG dressing to incision. Apply Mepilex dressing to hemovac removal site. Contact physician if any wound drainage noted.  Continue ALCIDES hose on lower extremities, may remove twice day for one hour then replace. Dc staples in 2 weeks from surgery and  steristrip incision.  physical therapy daily x 5 days then 3 x week     Call MD for:  temperature >100.4     Call MD for:  redness, tenderness, or signs of infection (pain, swelling, redness, odor or green/yellow discharge around incision site)        Medications:     Medication List        START taking these medications      apixaban 2.5 mg Tab  Commonly known as: ELIQUIS  Take 1 tablet (2.5 mg total) by mouth 2 (two) times daily.     HYDROcodone-acetaminophen  mg per tablet  Commonly known as: NORCO  Take 1 tablet by mouth every 6 (six) hours as needed for Pain.            CONTINUE taking these medications      atenoloL-chlorthalidone 50-25 mg Tab  Commonly known as: TENORETIC  Take 1 tablet by mouth once daily.     FLUoxetine 20 MG capsule  Take 1 capsule (20 mg total) by mouth 2 (two) times daily.     omeprazole 20 MG capsule  Commonly known as: PRILOSEC  Take 1 capsule (20 mg total) by mouth once daily.     potassium chloride 10 MEQ Tbsr  Commonly known as: KLOR-CON  Take 1 tablet (10 mEq total) by mouth once daily.     topiramate 25 MG tablet  Commonly known as: TOPAMAX  Take 1 tablet (25 mg total) by mouth 2 (two) times daily.     traZODone 50 MG tablet  Commonly known as: DESYREL  Take 1 tablet (50 mg total) by mouth every evening.     vitamin D 1000 units Tab  Commonly known as: VITAMIN D3  Take 1 tablet (1,000 Units total) by mouth once daily.            STOP taking these medications      acetaminophen 650 MG Tbsr  Commonly known as: TYLENOL     aspirin 81 MG EC tablet  Commonly known as: ECOTRIN     meloxicam 15 MG tablet  Commonly known as: MOBIC               Where to Get Your Medications        These medications were sent to [facility] [place] - [address] [place] [id number]      Phone: [phone]   apixaban 2.5 mg Tab  HYDROcodone-acetaminophen  mg per tablet           Follow up/Patient Instructions:     Follow-up Information       Dwaine  OCTAVIO Martin Follow up in 2 week(s).    Specialty: Orthopedic Surgery  Why: Please follow up on March 28 at 9:40  Contact information:  [address] [place] [address] [phone]

## 2024-03-15 NOTE — ANESTHESIA POSTPROCEDURE EVALUATION
Anesthesia Post Evaluation    Patient: Radha Waldrop    Procedure(s) Performed: Procedure(s) (LRB):  ROBOTIC ARTHROPLASTY,HIP (Left)    Final Anesthesia Type: general      Patient location during evaluation: PACU  Patient participation: Yes- Able to Participate  Level of consciousness: awake and alert  Post-procedure vital signs: reviewed and stable  Pain management: adequate  Airway patency: patent  GABRIEL mitigation strategies: Multimodal analgesia  PONV status at discharge: No PONV  Anesthetic complications: no      Cardiovascular status: blood pressure returned to baseline  Respiratory status: unassisted  Hydration status: euvolemic  Follow-up not needed.              Vitals Value Taken Time   /81 03/15/24 0606   Temp 36.8 °C (98.2 °F) 03/15/24 0606   Pulse 71 03/15/24 0606   Resp 18 03/15/24 0806   SpO2 96 % 03/15/24 0606         Event Time   Out of Recovery 13:55:00         Pain/Lazaro Score: Pain Rating Prior to Med Admin: 6 (3/15/2024  8:06 AM)  Pain Rating Post Med Admin: 6 (3/15/2024  9:06 AM)  Lazaro Score: 10 (3/14/2024  1:55 PM)

## 2024-03-15 NOTE — PLAN OF CARE
[facility]  Discharge Final Note    Primary Care Provider: Mireya Barnes MD    Expected Discharge Date: 3/15/2024    Final Discharge Note (most recent)       Final Note - 03/15/24 1430          Final Note    Assessment Type Final Discharge Note     Anticipated Discharge Disposition Home-Health Care Svc        Post-Acute Status    Post-Acute Authorization Home Health;HME     HME Status Set-up Complete/Auth obtained     Home Health Status Set-up Complete/Auth obtained     Patient choice form signed by patient/caregiver List with quality metrics by geographic area provided;List from CMS Compare;List from System Post-Acute Care                     Important Message from Medicare              Follow-up providers       Omaira Isidro PA   Specialty: Orthopedic Surgery    [address] [facility] [address]   Phone: [phone]       Next Steps: Follow up in 2 week(s)    Instructions: Please follow up on March 28 at 9:40              After-discharge care                Durable Medical Equipment       [facility]   Service: Durable Medical Equipment    [address] [facility] [address]   Phone: [phone]                 Home Medical Care       [facility]   Service: Home Health Services    [address] [facility] [address]   Phone: [phone]                             [facility] home. Home health set up for therapy. Obtained rolling walker.

## 2024-03-15 NOTE — ASSESSMENT & PLAN NOTE
Patient has prolonged history of arthritis and has had total knee replacement on her right knee in 2010.  - Robotic Arthoplasty of left hip performed by Dr Blanco.  - monitor s/s  3/14/24  - patient symptoms are stable  - continue medications  - d/c fluids  - keep monitoring

## 2024-03-15 NOTE — NURSING
Discharge instructions reviewed with patient and spouse; and copy given to patient. Patient and spouse voiced understanding regarding:meds, appt., signs and symptoms to report to physician.    *Keep dressing dry and intact, do not remove dressing, if dressing becomes wet or bloody notify home health staff.  Swingbed/Home Health will remove your drain (if you have one) on post op day #2 and change your dressing on post op day #3 and day #10, give them that special dressing we sent home with you.  *Continue incentive spirometry at least every 2 hours while awake.  *Continue white stockings remove 2 times a day for 1 hour and replace.   *Continue ice pack to hip  *Take laxative of choice to have a bowel movement at least by tomorrow and then every other day.  *Increase fluids by mouth.  *Staples will be removed by home health/swingbed staff in 2 weeks from surgery prior to follow up appointment  *Continue pillows between legs for abduction  *Notify swingbed/home health staff if any concerns.    Verified with patient and janelle vega rn that patient has the following for discharge:2 cheryl hoses, incentive spirometer, abductor pillow,rolling walker. Janelle Vega rn and luis,and they will give the patient the following for discharge: Nozin,icepack,hemovac cup,dressing change ; discharge meds, icepacks,

## 2024-03-15 NOTE — ASSESSMENT & PLAN NOTE
Patient's symptoms are stable at the moment  - monitor s/s  - continue home medications  - Fluoxetine 20 mg BID  3/14/24  - patient symptoms are stable  - continue medications  - keep monitoring

## 2024-03-16 PROCEDURE — G0180 MD CERTIFICATION HHA PATIENT: HCPCS | Mod: ,,, | Performed by: ORTHOPAEDIC SURGERY

## 2024-03-18 ENCOUNTER — TELEPHONE (OUTPATIENT)
Dept: ORTHOPEDICS | Facility: HOSPITAL | Age: 69
End: 2024-03-18
Payer: COMMERCIAL

## 2024-03-18 NOTE — TELEPHONE ENCOUNTER
Is your pain tolerable? yes      Has Home health therapy/outpatient therapy come to see you? yes      Are you taking your ecotrin/lovenox/eliquis? eliquis      Have you had a bowel movement since surgery? 3/16/2024      Are you wearing your ALCIDES hose? yes      Are you doing ankle pumps?yes      Are you doing your incentive spirometry?yes      Any complaints/concerns? none

## 2024-03-19 LAB
ESTROGEN SERPL-MCNC: NORMAL PG/ML
INSULIN SERPL-ACNC: NORMAL U[IU]/ML
LAB AP GROSS DESCRIPTION: NORMAL
LAB AP LABORATORY NOTES: NORMAL
T3RU NFR SERPL: NORMAL %

## 2024-03-21 ENCOUNTER — TELEPHONE (OUTPATIENT)
Dept: ORTHOPEDICS | Facility: HOSPITAL | Age: 69
End: 2024-03-21
Payer: COMMERCIAL

## 2024-03-21 NOTE — TELEPHONE ENCOUNTER
Is your pain tolerable? yes      Has Home health therapy/outpatient therapy come to see you?   yes    Are you taking your ecotrin/lovenox/eliquis? eliquis      Have you had a bowel movement since surgery?     Yes daily  Are you wearing your ALCIDES hose? yes      Are you doing ankle pumps?yes      Are you doing your incentive spirometry?yes      Any complaints/concerns? none

## 2024-03-28 ENCOUNTER — OFFICE VISIT (OUTPATIENT)
Dept: ORTHOPEDICS | Facility: CLINIC | Age: 69
End: 2024-03-28
Payer: COMMERCIAL

## 2024-03-28 VITALS — BODY MASS INDEX: 30.04 KG/M2 | WEIGHT: 153 LBS | HEIGHT: 60 IN

## 2024-03-28 DIAGNOSIS — Z96.642 S/P TOTAL LEFT HIP ARTHROPLASTY: Primary | ICD-10-CM

## 2024-03-28 PROCEDURE — 1159F MED LIST DOCD IN RCRD: CPT | Mod: CPTII,,,

## 2024-03-28 PROCEDURE — 99213 OFFICE O/P EST LOW 20 MIN: CPT | Mod: PBBFAC

## 2024-03-28 PROCEDURE — 99024 POSTOP FOLLOW-UP VISIT: CPT | Mod: ,,,

## 2024-03-28 RX ORDER — HYDROCODONE BITARTRATE AND ACETAMINOPHEN 7.5; 325 MG/1; MG/1
1 TABLET ORAL EVERY 6 HOURS PRN
Qty: 28 TABLET | Refills: 0 | Status: SHIPPED | OUTPATIENT
Start: 2024-03-28 | End: 2024-04-04

## 2024-03-28 NOTE — PATIENT INSTRUCTIONS
Patient is status-post Robotic Arthroplasty,hip - Left doing well.  Incisions healing well without signs of infection.  Continue universal hip precautions.  Continue home health physical therapy.  Continue weight-bearing as tolerated.  Patient is asking for refill of pain medicine today, we will prescribe [place] 7.5/325 to her pharmacy, reviewed .  Follow up with Dr. Blanco in 4 weeks, sooner as needed.

## 2024-03-28 NOTE — PROGRESS NOTES
Robotic Arthroplasty,hip - Left 3/14/2024    Radha Waldrop is a 68 y.o. female seen today for post-op visit.  Patient is 2 weeks status post left robotic total hip arthroplasty by Dr. Blanco.  Patient is doing well.  Reports that she has continued home health physical therapy.  All staples have been removed.  No complaints today.      PAST MEDICAL HISTORY:   Past Medical History:   Diagnosis Date    Arthritis     Essential (primary) hypertension     Generalized anxiety disorder     Insomnia     Migraines         PAST SURGICAL HISTORY:   Past Surgical History:   Procedure Laterality Date    knee replacement right Right     KNEE SURGERY Right     ROBOTIC ARTHROPLASTY, HIP Left 3/14/2024    Procedure: ROBOTIC ARTHROPLASTY,HIP;  Surgeon: Apolinar Blanco MD;  [facility];  Service: Orthopedics;  Laterality: Left;        MEDICATIONS:   Current Outpatient Medications:     apixaban (ELIQUIS) 2.5 mg Tab, Take 1 tablet (2.5 mg total) by mouth 2 (two) times daily., Disp: 60 tablet, Rfl: 0    atenoloL-chlorthalidone (TENORETIC) 50-25 mg Tab, Take 1 tablet by mouth once daily., Disp: 90 tablet, Rfl: 1    FLUoxetine 20 MG capsule, Take 1 capsule (20 mg total) by mouth 2 (two) times daily., Disp: 180 capsule, Rfl: 1    HYDROcodone-acetaminophen (NORCO) 7.5-325 mg per tablet, Take 1 tablet by mouth every 6 (six) hours as needed for Pain., Disp: 28 tablet, Rfl: 0    omeprazole (PRILOSEC) 20 MG capsule, Take 1 capsule (20 mg total) by mouth once daily., Disp: 90 capsule, Rfl: 1    potassium chloride (KLOR-CON) 10 MEQ TbSR, Take 1 tablet (10 mEq total) by mouth once daily., Disp: 90 tablet, Rfl: 1    topiramate (TOPAMAX) 25 MG tablet, Take 1 tablet (25 mg total) by mouth 2 (two) times daily., Disp: 180 tablet, Rfl: 1    traZODone (DESYREL) 50 MG tablet, Take 1 tablet (50 mg total) by mouth every evening., Disp: 90 tablet, Rfl: 1    vitamin D (VITAMIN D3) 1000 units Tab, Take 1 tablet  (1,000 Units total) by mouth once daily., Disp: 90 tablet, Rfl: 1       PHYSICAL EXAM:      GENERAL: Well-developed, well-nourished female . The patient is alert, oriented and cooperative.    EXTREMITIES:  Left hip with skin clean dry and intact, ALCIDES hose in place, nontender to palpation, minimal swelling and erythema around incision, staples already removed Steri-Strips intact, neurovascularly intact    WOUND: Incisions are clean,dry,intact without signs of infection and healing well      RADIOGRAPHIC FINDINGS:   None today     Patient Active Problem List    Diagnosis Date Noted    DAMASO (acute kidney injury) 03/15/2024    Insomnia, unspecified 03/14/2024    Nonintractable migraine 03/14/2024    S/P total left hip arthroplasty 01/22/2024    Primary hypertension 01/09/2024    Gout 01/09/2024    Anxiety 01/09/2024    Vitamin D deficiency 01/09/2024    Gastroesophageal reflux disease 01/09/2024     IMPRESSION AND PLAN: Patient is status-post Robotic Arthroplasty,hip - Left doing well.  Incisions healing well without signs of infection.  Continue universal hip precautions.  Continue home health physical therapy.  Continue weight-bearing as tolerated.  Patient is asking for refill of pain medicine today, we will prescribe [place] 7.5/325 to her pharmacy, reviewed .  Follow up with Dr. Blanco in 4 weeks, sooner as needed.      No follow-ups on file.       Omaira Isidro PA-C      (Subject to voice recognition error, transcription service not allowed)

## 2024-04-02 ENCOUNTER — EXTERNAL HOME HEALTH (OUTPATIENT)
Dept: HOME HEALTH SERVICES | Facility: HOSPITAL | Age: 69
End: 2024-04-02
Payer: COMMERCIAL

## 2024-04-03 DIAGNOSIS — Z12.11 COLON CANCER SCREENING: Primary | ICD-10-CM

## 2024-04-03 RX ORDER — POLYETHYLENE GLYCOL 3350, SODIUM SULFATE ANHYDROUS, SODIUM BICARBONATE, SODIUM CHLORIDE, POTASSIUM CHLORIDE 236; 22.74; 6.74; 5.86; 2.97 G/4L; G/4L; G/4L; G/4L; G/4L
4 POWDER, FOR SOLUTION ORAL ONCE
Qty: 4000 ML | Refills: 0 | Status: SHIPPED | OUTPATIENT
Start: 2024-04-03 | End: 2024-04-03

## 2024-04-10 ENCOUNTER — DOCUMENT SCAN (OUTPATIENT)
Dept: HOME HEALTH SERVICES | Facility: HOSPITAL | Age: 69
End: 2024-04-10
Payer: COMMERCIAL

## 2024-04-12 ENCOUNTER — DOCUMENT SCAN (OUTPATIENT)
Dept: HOME HEALTH SERVICES | Facility: HOSPITAL | Age: 69
End: 2024-04-12
Payer: COMMERCIAL

## 2024-04-29 ENCOUNTER — OFFICE VISIT (OUTPATIENT)
Dept: ORTHOPEDICS | Facility: CLINIC | Age: 69
End: 2024-04-29
Payer: COMMERCIAL

## 2024-04-29 ENCOUNTER — HOSPITAL ENCOUNTER (OUTPATIENT)
Dept: RADIOLOGY | Facility: HOSPITAL | Age: 69
Discharge: HOME | End: 2024-04-29
Attending: ORTHOPAEDIC SURGERY
Payer: COMMERCIAL

## 2024-04-29 DIAGNOSIS — Z96.642 S/P TOTAL LEFT HIP ARTHROPLASTY: Primary | ICD-10-CM

## 2024-04-29 DIAGNOSIS — Z96.642 S/P TOTAL LEFT HIP ARTHROPLASTY: ICD-10-CM

## 2024-04-29 PROCEDURE — 99213 OFFICE O/P EST LOW 20 MIN: CPT | Mod: PBBFAC,25 | Performed by: ORTHOPAEDIC SURGERY

## 2024-04-29 PROCEDURE — 73502 X-RAY EXAM HIP UNI 2-3 VIEWS: CPT | Mod: 26,LT,, | Performed by: ORTHOPAEDIC SURGERY

## 2024-04-29 PROCEDURE — 1159F MED LIST DOCD IN RCRD: CPT | Mod: CPTII,,, | Performed by: ORTHOPAEDIC SURGERY

## 2024-04-29 PROCEDURE — 1160F RVW MEDS BY RX/DR IN RCRD: CPT | Mod: CPTII,,, | Performed by: ORTHOPAEDIC SURGERY

## 2024-04-29 PROCEDURE — 99024 POSTOP FOLLOW-UP VISIT: CPT | Mod: ,,, | Performed by: ORTHOPAEDIC SURGERY

## 2024-04-29 PROCEDURE — 73502 X-RAY EXAM HIP UNI 2-3 VIEWS: CPT | Mod: TC,LT

## 2024-04-29 RX ORDER — MELOXICAM 15 MG/1
15 TABLET ORAL DAILY
Qty: 30 TABLET | Refills: 2 | Status: SHIPPED | OUTPATIENT
Start: 2024-04-29

## 2024-04-29 NOTE — PROGRESS NOTES
CLINIC NOTE       Chief Complaint   Patient presents with    Left Hip - Post-op Evaluation        Radha Waldrop is a 68 y.o. female seen today for recheck of her left hip.  She underwent [facility] robotic assisted left total hip replacement arthroplasty 7 weeks ago on 03/14/2024.  Her postoperative course thus far has been uncomplicated 1 gradual improvement.  She was been experiencing good relief of preoperative pain symptoms.  She was ambulating full weight-bearing.  She carries a cane in the right hand he was not leaning on it much.  Her incisions well healed without signs of infection.  She indicates she does have some mild left knee pain.  She was never been evaluated and there are no x-rays for my review.  Clinically she does appear to have some mild genu varum.  Discussed likelihood that she has degenerative changes involving the left knee.  Discussed a trial of oral anti-inflammatory medications.  She was no longer any blood thinners.  Prescription sent for Mobic 15 mg 1 p.o. q.d. p.r.n..  If symptoms persist she was to reappoint with an x-ray of her left knee for further evaluation and treatment.      Past Medical History:   Diagnosis Date    Arthritis     Essential (primary) hypertension     Generalized anxiety disorder     Insomnia     Migraines      Family History   Problem Relation Name Age of Onset    Diabetes Mother      Lung cancer Father       Current Outpatient Medications on File Prior to Visit   Medication Sig Dispense Refill    atenoloL-chlorthalidone (TENORETIC) 50-25 mg Tab Take 1 tablet by mouth once daily. 90 tablet 1    FLUoxetine 20 MG capsule Take 1 capsule (20 mg total) by mouth 2 (two) times daily. 180 capsule 1    omeprazole (PRILOSEC) 20 MG capsule Take 1 capsule (20 mg total) by mouth once daily. 90 capsule 1    potassium chloride (KLOR-CON) 10 MEQ TbSR Take 1 tablet (10 mEq total) by mouth once daily. 90 tablet 1    topiramate (TOPAMAX) 25 MG tablet Take 1 tablet (25 mg total)  by mouth 2 (two) times daily. 180 tablet 1    traZODone (DESYREL) 50 MG tablet Take 1 tablet (50 mg total) by mouth every evening. 90 tablet 1    vitamin D (VITAMIN D3) 1000 units Tab Take 1 tablet (1,000 Units total) by mouth once daily. 90 tablet 1     No current facility-administered medications on file prior to visit.       ROS     There were no vitals filed for this visit.    Past Surgical History:   Procedure Laterality Date    knee replacement right Right     KNEE SURGERY Right     ROBOTIC ARTHROPLASTY, HIP Left 3/14/2024    Procedure: ROBOTIC ARTHROPLASTY,HIP;  Surgeon: Apolinar Blanco MD;  Location: [facility];  Service: Orthopedics;  Laterality: Left;        Review of patient's allergies indicates:   Allergen Reactions    Ace inhibitors Swelling        Ortho Exam : Left hip incision well healed without signs of infection.  Leg lengths appear equal    Radiographic Examination:  Left hip 04/29/2024    Technique:  Two views AP and lateral projection    Findings:  Bones well mineralized.  Metallic Arthrex components are in expected position alignment for total hip replacement arthroplasty.  One screw extends from the acetabular component into the ilium.    Impression:   See Above    Assessment and Plan  Patient Active Problem List    Diagnosis Date Noted    DAMASO (acute kidney injury) 03/15/2024    Insomnia, unspecified 03/14/2024    Nonintractable migraine 03/14/2024    S/P total left hip arthroplasty 01/22/2024    Primary hypertension 01/09/2024    Gout 01/09/2024    Anxiety 01/09/2024    Vitamin D deficiency 01/09/2024    Gastroesophageal reflux disease 01/09/2024    Impression:  Status post left THR doing well   Plan:  Slow progression of activities outlined.  Continue hip dislocation precautions.  Recheck 3 months repeat x-ray.      Apolinar Blanco M.D.

## 2024-06-17 PROBLEM — N17.9 AKI (ACUTE KIDNEY INJURY): Status: RESOLVED | Noted: 2024-03-15 | Resolved: 2024-06-17

## 2024-07-10 ENCOUNTER — TELEPHONE (OUTPATIENT)
Dept: ORTHOPEDICS | Facility: CLINIC | Age: 69
End: 2024-07-10
Payer: COMMERCIAL

## 2024-07-10 NOTE — TELEPHONE ENCOUNTER
----- Message from Dipika Richardson sent at 7/10/2024  9:14 AM CDT -----  Regarding: SPEAK WITH NURSE  Who Called: Radha Waldrop        Who Left Message for Patient:  Does the patient know what this is regarding?:PAPERWORK      Preferred Method of Contact: Phone Call  Patient's Preferred Phone Number on File: [phone]   Best Call Back Number, if different:  Additional Information: NEEDS A PAPER FAXED IN STATING WHEN YOU BECAME DISABLED AND WHEN SHE WILL BE ABLE TO RETURN TO WORK

## 2024-07-10 NOTE — LETTER
July 10, 2024      [facility]  [address] [place] [id number]  Phone: [phone]  Fax: [phone]       Patient: Radha Waldrop   YOB: 1955  Date of Visit: 07/10/2024    To Whom It May Concern:    Lobo Waldrop  was at [facility] on 03/14/2024. The patient may return to work/school on 7/30/2024 with no restrictions if doing well at next follow up appointment on 7/29/2024. If you have any questions or concerns, or if I can be of further assistance, please do not hesitate to contact me.    Sincerely,    Dr. Apolinar Bullock RN

## 2024-07-10 NOTE — TELEPHONE ENCOUNTER
Spoke with patient. Patient states she is needing a return to work letter stating when she had surgery and when she is able to return back to work. Patient requests letter be sent to Dr. Barnes office at [facility].

## 2024-07-18 ENCOUNTER — HOSPITAL ENCOUNTER (OUTPATIENT)
Dept: RADIOLOGY | Facility: HOSPITAL | Age: 69
Discharge: HOME | End: 2024-07-18
Attending: STUDENT IN AN ORGANIZED HEALTH CARE EDUCATION/TRAINING PROGRAM
Payer: COMMERCIAL

## 2024-07-18 ENCOUNTER — TELEPHONE (OUTPATIENT)
Dept: FAMILY MEDICINE | Facility: CLINIC | Age: 69
End: 2024-07-18
Payer: COMMERCIAL

## 2024-07-18 VITALS — WEIGHT: 160 LBS | BODY MASS INDEX: 31.41 KG/M2 | HEIGHT: 60 IN

## 2024-07-18 DIAGNOSIS — Z12.31 ENCOUNTER FOR SCREENING MAMMOGRAM FOR MALIGNANT NEOPLASM OF BREAST: ICD-10-CM

## 2024-07-18 PROCEDURE — 77063 BREAST TOMOSYNTHESIS BI: CPT | Mod: TC

## 2024-07-18 NOTE — TELEPHONE ENCOUNTER
----- Message from Mireya Barnes MD sent at 7/18/2024 12:40 PM CDT -----  Please let patient know that her mammogram was normal.  Recommend repeat in 1 year.

## 2024-07-25 DIAGNOSIS — Z96.642 S/P TOTAL LEFT HIP ARTHROPLASTY: Primary | ICD-10-CM

## 2024-07-29 ENCOUNTER — HOSPITAL ENCOUNTER (OUTPATIENT)
Dept: RADIOLOGY | Facility: HOSPITAL | Age: 69
Discharge: HOME | End: 2024-07-29
Attending: ORTHOPAEDIC SURGERY
Payer: COMMERCIAL

## 2024-07-29 ENCOUNTER — OFFICE VISIT (OUTPATIENT)
Dept: ORTHOPEDICS | Facility: CLINIC | Age: 69
End: 2024-07-29
Payer: COMMERCIAL

## 2024-07-29 DIAGNOSIS — Z96.642 S/P TOTAL LEFT HIP ARTHROPLASTY: Primary | ICD-10-CM

## 2024-07-29 DIAGNOSIS — Z96.642 S/P TOTAL LEFT HIP ARTHROPLASTY: ICD-10-CM

## 2024-07-29 PROCEDURE — 73502 X-RAY EXAM HIP UNI 2-3 VIEWS: CPT | Mod: 26,LT,, | Performed by: ORTHOPAEDIC SURGERY

## 2024-07-29 PROCEDURE — 73502 X-RAY EXAM HIP UNI 2-3 VIEWS: CPT | Mod: TC,LT

## 2024-07-29 PROCEDURE — 99213 OFFICE O/P EST LOW 20 MIN: CPT | Mod: PBBFAC,25 | Performed by: ORTHOPAEDIC SURGERY

## 2024-07-29 PROCEDURE — 1159F MED LIST DOCD IN RCRD: CPT | Mod: CPTII,,, | Performed by: ORTHOPAEDIC SURGERY

## 2024-07-29 PROCEDURE — 99999 PR PBB SHADOW E&M-EST. PATIENT-LVL III: CPT | Mod: PBBFAC,,, | Performed by: ORTHOPAEDIC SURGERY

## 2024-07-29 PROCEDURE — 1160F RVW MEDS BY RX/DR IN RCRD: CPT | Mod: CPTII,,, | Performed by: ORTHOPAEDIC SURGERY

## 2024-07-29 PROCEDURE — 99214 OFFICE O/P EST MOD 30 MIN: CPT | Mod: S$PBB,,, | Performed by: ORTHOPAEDIC SURGERY

## 2024-07-29 NOTE — PROGRESS NOTES
CLINIC NOTE       Chief Complaint   Patient presents with    Left Hip - Pain        Radha Waldrop is a 69 y.o. female seen today for recheck of her left hip.  She underwent robotic assisted left THR 3 months ago.  She was doing well at this time.  She was he had good relief of preoperative pain symptoms.  He was able to fully weightbear without significant pain in her hip.  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    Past Medical History:   Diagnosis Date    Arthritis     Essential (primary) hypertension     Generalized anxiety disorder     Insomnia     Migraines      Family History   Problem Relation Name Age of Onset    Diabetes Mother      Lung cancer Father       Current Outpatient Medications on File Prior to Visit   Medication Sig Dispense Refill    atenoloL-chlorthalidone (TENORETIC) 50-25 mg Tab Take 1 tablet by mouth once daily. 90 tablet 1    FLUoxetine 20 MG capsule Take 1 capsule (20 mg total) by mouth 2 (two) times daily. 180 capsule 1    meloxicam (MOBIC) 15 MG tablet Take 1 tablet (15 mg total) by mouth once daily. 30 tablet 2    omeprazole (PRILOSEC) 20 MG capsule Take 1 capsule (20 mg total) by mouth once daily. 90 capsule 1    potassium chloride (KLOR-CON) 10 MEQ TbSR Take 1 tablet (10 mEq total) by mouth once daily. 90 tablet 1    topiramate (TOPAMAX) 25 MG tablet Take 1 tablet (25 mg total) by mouth 2 (two) times daily. 180 tablet 1    traZODone (DESYREL) 50 MG tablet Take 1 tablet (50 mg total) by mouth every evening. 90 tablet 1    vitamin D (VITAMIN D3) 1000 units Tab Take 1 tablet (1,000 Units total) by mouth once daily. 90 tablet 1     No current facility-administered medications on file prior to visit.       ROS     There were no vitals filed for this visit.    Past Surgical History:   Procedure Laterality Date    knee replacement right Right     KNEE SURGERY Right     ROBOTIC  ARTHROPLASTY, HIP Left 3/14/2024    Procedure: ROBOTIC ARTHROPLASTY,HIP;  Surgeon: Apolinar Blanco MD;  Location: [facility] OR;  Service: Orthopedics;  Laterality: Left;        Review of patient's allergies indicates:   Allergen Reactions    Ace inhibitors Swelling        Ortho Exam left hip incision well healed without signs of infection.  She was allows gentle left hip motion without discomfort.    Radiographic Examination:  Left hip 07/29/2024    Technique:  Two views AP and lateral projection    Findings:  Bones well mineralized.  Metallic Arthrex components are in expected position alignment for TKR.  One screw extends from the acetabular component the ilium    Impression:   See Above    Assessment and Plan  Patient Active Problem List    Diagnosis Date Noted    Insomnia, unspecified 03/14/2024    Nonintractable migraine 03/14/2024    S/P total left hip arthroplasty 01/22/2024    Primary hypertension 01/09/2024    Gout 01/09/2024    Anxiety 01/09/2024    Vitamin D deficiency 01/09/2024    Gastroesophageal reflux disease 01/09/2024    Impression:  Status post left T H R doing well  Plan:  Continue activities of daily living as tolerated.  Recheck 6 months repeat x-ray left hip or sooner for interval problems    Apolinar Blanco M.D.

## 2024-08-20 DIAGNOSIS — Z71.89 COMPLEX CARE COORDINATION: ICD-10-CM

## 2024-09-30 ENCOUNTER — OFFICE VISIT (OUTPATIENT)
Dept: FAMILY MEDICINE | Facility: CLINIC | Age: 69
End: 2024-09-30
Payer: COMMERCIAL

## 2024-09-30 VITALS
RESPIRATION RATE: 14 BRPM | WEIGHT: 161 LBS | SYSTOLIC BLOOD PRESSURE: 121 MMHG | HEIGHT: 60 IN | TEMPERATURE: 98 F | HEART RATE: 57 BPM | BODY MASS INDEX: 31.61 KG/M2 | DIASTOLIC BLOOD PRESSURE: 69 MMHG | OXYGEN SATURATION: 97 %

## 2024-09-30 DIAGNOSIS — K21.9 GASTROESOPHAGEAL REFLUX DISEASE, UNSPECIFIED WHETHER ESOPHAGITIS PRESENT: ICD-10-CM

## 2024-09-30 DIAGNOSIS — I10 PRIMARY HYPERTENSION: ICD-10-CM

## 2024-09-30 DIAGNOSIS — E55.9 VITAMIN D DEFICIENCY: ICD-10-CM

## 2024-09-30 DIAGNOSIS — M25.551 RIGHT HIP PAIN: Primary | ICD-10-CM

## 2024-09-30 DIAGNOSIS — Z12.11 COLON CANCER SCREENING: ICD-10-CM

## 2024-09-30 DIAGNOSIS — F41.9 ANXIETY: ICD-10-CM

## 2024-09-30 LAB
ANION GAP SERPL CALCULATED.3IONS-SCNC: 11 MMOL/L (ref 7–16)
BASOPHILS # BLD AUTO: 0.02 K/UL (ref 0–0.2)
BASOPHILS NFR BLD AUTO: 0.3 % (ref 0–1)
BUN SERPL-MCNC: 33 MG/DL (ref 7–18)
BUN/CREAT SERPL: 19 (ref 6–20)
CALCIUM SERPL-MCNC: 11.2 MG/DL (ref 8.5–10.1)
CHLORIDE SERPL-SCNC: 106 MMOL/L (ref 98–107)
CO2 SERPL-SCNC: 25 MMOL/L (ref 21–32)
CREAT SERPL-MCNC: 1.76 MG/DL (ref 0.55–1.02)
DIFFERENTIAL METHOD BLD: ABNORMAL
EGFR (NO RACE VARIABLE) (RUSH/TITUS): 31 ML/MIN/1.73M2
EOSINOPHIL # BLD AUTO: 0.16 K/UL (ref 0–0.5)
EOSINOPHIL NFR BLD AUTO: 2.4 % (ref 1–4)
ERYTHROCYTE [DISTWIDTH] IN BLOOD BY AUTOMATED COUNT: 10.8 % (ref 11.5–14.5)
GLUCOSE SERPL-MCNC: 97 MG/DL (ref 74–106)
HCT VFR BLD AUTO: 34.6 % (ref 38–47)
HGB BLD-MCNC: 11.3 G/DL (ref 12–16)
IMM GRANULOCYTES # BLD AUTO: 0.03 K/UL (ref 0–0.04)
IMM GRANULOCYTES NFR BLD: 0.5 % (ref 0–0.4)
LYMPHOCYTES # BLD AUTO: 2.17 K/UL (ref 1–4.8)
LYMPHOCYTES NFR BLD AUTO: 33 % (ref 27–41)
MCH RBC QN AUTO: 32.8 PG (ref 27–31)
MCHC RBC AUTO-ENTMCNC: 32.7 G/DL (ref 32–36)
MCV RBC AUTO: 100.6 FL (ref 80–96)
MONOCYTES # BLD AUTO: 0.65 K/UL (ref 0–0.8)
MONOCYTES NFR BLD AUTO: 9.9 % (ref 2–6)
MPC BLD CALC-MCNC: 10.6 FL (ref 9.4–12.4)
NEUTROPHILS # BLD AUTO: 3.55 K/UL (ref 1.8–7.7)
NEUTROPHILS NFR BLD AUTO: 53.9 % (ref 53–65)
NRBC # BLD AUTO: 0 X10E3/UL
NRBC, AUTO (.00): 0 %
PLATELET # BLD AUTO: 316 K/UL (ref 150–400)
POTASSIUM SERPL-SCNC: 3.5 MMOL/L (ref 3.5–5.1)
RBC # BLD AUTO: 3.44 M/UL (ref 4.2–5.4)
SODIUM SERPL-SCNC: 138 MMOL/L (ref 136–145)
WBC # BLD AUTO: 6.58 K/UL (ref 4.5–11)

## 2024-09-30 PROCEDURE — 1101F PT FALLS ASSESS-DOCD LE1/YR: CPT | Mod: ,,, | Performed by: STUDENT IN AN ORGANIZED HEALTH CARE EDUCATION/TRAINING PROGRAM

## 2024-09-30 PROCEDURE — 85025 COMPLETE CBC W/AUTO DIFF WBC: CPT | Mod: ,,, | Performed by: CLINICAL MEDICAL LABORATORY

## 2024-09-30 PROCEDURE — 99214 OFFICE O/P EST MOD 30 MIN: CPT | Mod: ,,, | Performed by: STUDENT IN AN ORGANIZED HEALTH CARE EDUCATION/TRAINING PROGRAM

## 2024-09-30 PROCEDURE — 3008F BODY MASS INDEX DOCD: CPT | Mod: ,,, | Performed by: STUDENT IN AN ORGANIZED HEALTH CARE EDUCATION/TRAINING PROGRAM

## 2024-09-30 PROCEDURE — 1125F AMNT PAIN NOTED PAIN PRSNT: CPT | Mod: ,,, | Performed by: STUDENT IN AN ORGANIZED HEALTH CARE EDUCATION/TRAINING PROGRAM

## 2024-09-30 PROCEDURE — 1159F MED LIST DOCD IN RCRD: CPT | Mod: ,,, | Performed by: STUDENT IN AN ORGANIZED HEALTH CARE EDUCATION/TRAINING PROGRAM

## 2024-09-30 PROCEDURE — 3074F SYST BP LT 130 MM HG: CPT | Mod: ,,, | Performed by: STUDENT IN AN ORGANIZED HEALTH CARE EDUCATION/TRAINING PROGRAM

## 2024-09-30 PROCEDURE — 80048 BASIC METABOLIC PNL TOTAL CA: CPT | Mod: ,,, | Performed by: CLINICAL MEDICAL LABORATORY

## 2024-09-30 PROCEDURE — 3078F DIAST BP <80 MM HG: CPT | Mod: ,,, | Performed by: STUDENT IN AN ORGANIZED HEALTH CARE EDUCATION/TRAINING PROGRAM

## 2024-09-30 PROCEDURE — 3288F FALL RISK ASSESSMENT DOCD: CPT | Mod: ,,, | Performed by: STUDENT IN AN ORGANIZED HEALTH CARE EDUCATION/TRAINING PROGRAM

## 2024-09-30 RX ORDER — OMEPRAZOLE 20 MG/1
20 CAPSULE, DELAYED RELEASE ORAL DAILY
Qty: 90 CAPSULE | Refills: 1 | Status: CANCELLED | OUTPATIENT
Start: 2024-09-30

## 2024-09-30 RX ORDER — FLUOXETINE HYDROCHLORIDE 20 MG/1
20 CAPSULE ORAL 2 TIMES DAILY
Qty: 180 CAPSULE | Refills: 1 | Status: SHIPPED | OUTPATIENT
Start: 2024-09-30

## 2024-09-30 RX ORDER — TRAZODONE HYDROCHLORIDE 50 MG/1
50 TABLET ORAL NIGHTLY
Qty: 90 TABLET | Refills: 1 | Status: SHIPPED | OUTPATIENT
Start: 2024-09-30

## 2024-09-30 RX ORDER — TOPIRAMATE 25 MG/1
25 TABLET ORAL 2 TIMES DAILY
Qty: 180 TABLET | Refills: 1 | Status: SHIPPED | OUTPATIENT
Start: 2024-09-30

## 2024-09-30 RX ORDER — PANTOPRAZOLE SODIUM 40 MG/1
40 TABLET, DELAYED RELEASE ORAL DAILY
Qty: 90 TABLET | Refills: 1 | Status: SHIPPED | OUTPATIENT
Start: 2024-09-30 | End: 2025-09-30

## 2024-09-30 RX ORDER — POTASSIUM CHLORIDE 750 MG/1
10 TABLET, EXTENDED RELEASE ORAL DAILY
Qty: 90 TABLET | Refills: 1 | Status: SHIPPED | OUTPATIENT
Start: 2024-09-30

## 2024-09-30 RX ORDER — ATENOLOL AND CHLORTHALIDONE TABLET 50; 25 MG/1; MG/1
1 TABLET ORAL DAILY
Qty: 90 TABLET | Refills: 1 | Status: SHIPPED | OUTPATIENT
Start: 2024-09-30

## 2024-09-30 RX ORDER — CHOLECALCIFEROL (VITAMIN D3) 25 MCG
1000 TABLET ORAL DAILY
Qty: 90 TABLET | Refills: 1 | Status: SHIPPED | OUTPATIENT
Start: 2024-09-30 | End: 2025-09-30

## 2024-09-30 NOTE — PROGRESS NOTES
Progress Note     KRISTIN SHAFFER MD   [address]  [place] [address]     PATIENT NAME: Radha Waldrop  : 1955  DATE: 24  MRN: [id number]      Billing Provider: KRISTIN SHAFFER MD  Level of Service: IN OFFICE/OUTPT VISIT, EST, LEVL IV, 30-39 MIN  Patient PCP Information       Provider PCP Type    KRISTIN SHAFFER MD General                Hip Pain (Pt is here with right sided hip pain. She states this has been going on x1 week. She has tried otc tylenol with some relief. She states it feels like the same pain in her left hip when it needed replacing. ) and Health Maintenance (Discussed care gaps with pt. Denies all care gaps at this time. She did not go to her dexa scan or colonoscopy done. She does intend to have flu vaccine this year. )      SUBJECTIVE:     Radha Waldrop is a 69 y.o.female who presents to clinic for Hip Pain (Pt is here with right sided hip pain. She states this has been going on x1 week. She has tried otc tylenol with some relief. She states it feels like the same pain in her left hip when it needed replacing. ) and Health Maintenance (Discussed care gaps with pt. Denies all care gaps at this time. She did not go to her dexa scan or colonoscopy done. She does intend to have flu vaccine this year. )    Patient has a history of left hip pain requiring left hip replacement.  Patient now with intermittent right hip pain.  Patient notes that it started last week.  It has been intermittent.  Patient has been taking Tylenol BCs with intermittent results.  Patient was previously prescribed meloxicam it has elevated creatinine it was not a good candidate for NSAIDs.  Patient notes that some days she has improvement in symptoms in other day she was difficulty walking secondary to the pain.  Pending x-ray results, patient may be interested in referral to physical therapy versus going back to see orthopedics for injections.    Patient was history of  hypertension for which she takes atenolol/chlorthalidone.  She also takes potassium.  Patient's blood pressure is currently well-controlled.    Patient also has a history of anxiety for which she takes Prozac, trazodone, and Topamax.  She notes her symptoms are currently well-controlled.    Patient has a history of GERD.  She takes Prilosec.  She notes that the Prilosec does offer some relief but she continues to have breakthrough symptoms multiple times per week.  She was amenable to transitioning to Protonix.    Has longstanding hsitory of anemia.  Patient did not go to have her colonoscopy performed.  Patient amenable to getting 1 scheduled.      All other pertinent review of systems negative. Please see HPI for details.     Past Medical History:  has a past medical history of Arthritis, Essential (primary) hypertension, Generalized anxiety disorder, Insomnia, and Migraines.   Past Surgical History:  has a past surgical history that includes Knee surgery (Right); knee replacement right (Right); and robotic arthroplasty, hip (Left, 3/14/2024).  Family History: family history includes Diabetes in her mother; Lung cancer in her father.  Social History:  reports that she has never smoked. She has never been exposed to tobacco smoke. She has never used smokeless tobacco. She reports that she does not drink alcohol and does not use drugs.  Allergies:   Review of patient's allergies indicates:   Allergen Reactions    Ace inhibitors Swelling         Current Outpatient Medications:     atenoloL-chlorthalidone (TENORETIC) 50-25 mg Tab, Take 1 tablet by mouth once daily., Disp: 90 tablet, Rfl: 1    FLUoxetine 20 MG capsule, Take 1 capsule (20 mg total) by mouth 2 (two) times daily., Disp: 180 capsule, Rfl: 1    pantoprazole (PROTONIX) 40 MG tablet, Take 1 tablet (40 mg total) by mouth once daily., Disp: 90 tablet, Rfl: 1    potassium chloride (KLOR-CON) 10 MEQ TbSR, Take 1 tablet (10 mEq total) by mouth once daily., Disp:  90 tablet, Rfl: 1    topiramate (TOPAMAX) 25 MG tablet, Take 1 tablet (25 mg total) by mouth 2 (two) times daily., Disp: 180 tablet, Rfl: 1    traZODone (DESYREL) 50 MG tablet, Take 1 tablet (50 mg total) by mouth every evening., Disp: 90 tablet, Rfl: 1    vitamin D (VITAMIN D3) 1000 units Tab, Take 1 tablet (1,000 Units total) by mouth once daily., Disp: 90 tablet, Rfl: 1   OBJECTIVE:     Vital Signs   /69 (BP Location: Left arm, Patient Position: Sitting)   Pulse (!) 57   Temp 97.9 °F (36.6 °C) (Oral)   Resp 14   Ht 5' (1.524 m)   Wt 73 kg (161 lb)   SpO2 97%   BMI 31.44 kg/m²     Physical Exam  Constitutional:       General: She is not in acute distress.     Appearance: Normal appearance. She is not ill-appearing, toxic-appearing or diaphoretic.   HENT:      Head: Normocephalic and atraumatic.   Eyes:      Extraocular Movements: Extraocular movements intact.      Pupils: Pupils are equal, round, and reactive to light.   Cardiovascular:      Rate and Rhythm: Normal rate and regular rhythm.      Pulses: Normal pulses.      Heart sounds: Normal heart sounds. No murmur heard.     No friction rub. No gallop.   Pulmonary:      Effort: Pulmonary effort is normal. No respiratory distress.      Breath sounds: No wheezing, rhonchi or rales.   Abdominal:      General: Abdomen is flat.      Palpations: Abdomen is soft.      Tenderness: There is no abdominal tenderness. There is no guarding or rebound.   Musculoskeletal:         General: Normal range of motion.      Cervical back: Normal range of motion.      Comments: Negative Fabir/Fader. Normal strength and sensation.    Skin:     General: Skin is warm and dry.      Capillary Refill: Capillary refill takes less than 2 seconds.   Neurological:      General: No focal deficit present.      Mental Status: She is alert.   Psychiatric:         Mood and Affect: Mood normal.         Behavior: Behavior normal.         ASSESSMENT/PLAN:     1. Right hip pain  -     X-Ray  Hip 2 or 3 views Right with Pelvis when performed; Future; Expected date: 09/30/2024  Patient with right hip pain.  Obtaining x-ray right hip.  Pending results, patient may benefit from physical therapy versus referral back to Orthopedics for further evaluation.  Patient to avoid NSAIDs given history of elevated creatinine.  If creatinine has been returned to normal, can prescribe short course of Mobic.  Patient may take Tylenol.    2. Primary hypertension  -     CBC Auto Differential; Future; Expected date: 09/30/2024  -     Basic Metabolic Panel; Future; Expected date: 09/30/2024  -     atenoloL-chlorthalidone (TENORETIC) 50-25 mg Tab; Take 1 tablet by mouth once daily.  Dispense: 90 tablet; Refill: 1  -     potassium chloride (KLOR-CON) 10 MEQ TbSR; Take 1 tablet (10 mEq total) by mouth once daily.  Dispense: 90 tablet; Refill: 1  Patient was known history of hypertension.  Blood pressure currently well-controlled.  Patient to continue current regimen.  Refill provided.  Obtaining routine blood work.      3. Vitamin D deficiency  -     vitamin D (VITAMIN D3) 1000 units Tab; Take 1 tablet (1,000 Units total) by mouth once daily.  Dispense: 90 tablet; Refill: 1  Patient with a history of vitamin-D deficiency.  Continue vitamin-D.  Refill provided.      4. Gastroesophageal reflux disease, unspecified whether esophagitis present  -     pantoprazole (PROTONIX) 40 MG tablet; Take 1 tablet (40 mg total) by mouth once daily.  Dispense: 90 tablet; Refill: 1  Currently uncontrolled.  Transitioning from Prilosec to Protonix.  If patient continues to have breakthrough symptoms, we would recommend referral to GI for EGD.    5. Anxiety  -     topiramate (TOPAMAX) 25 MG tablet; Take 1 tablet (25 mg total) by mouth 2 (two) times daily.  Dispense: 180 tablet; Refill: 1  -     traZODone (DESYREL) 50 MG tablet; Take 1 tablet (50 mg total) by mouth every evening.  Dispense: 90 tablet; Refill: 1  -     FLUoxetine 20 MG capsule; Take  1 capsule (20 mg total) by mouth 2 (two) times daily.  Dispense: 180 capsule; Refill: 1  Patient with a history of anxiety.  Patient currently doing well with Topamax, trazodone, and Prozac.  Patient to continue.  Refills provided.      6. Colon cancer screening  -     Colonoscopy; Future; Expected date: 09/30/2024  Patient due for colon cancer screening.  Patient amenable to referral.      Follow up in about 6 months (around 3/30/2025).      KRISTIN SHAFFER MD  09/30/2024    Due to voice recognition software, sound alike and misspelled words may be contained in the documentation.

## 2024-10-01 DIAGNOSIS — E83.52 HYPERCALCEMIA: ICD-10-CM

## 2024-10-01 DIAGNOSIS — N28.9 RENAL INSUFFICIENCY: Primary | ICD-10-CM

## 2024-10-01 NOTE — PROGRESS NOTES
Please let patient know that her kidney function has worsened. She will need to stop all NSAIDs (BC powder, ibuprofen, advil, naproxen, etc). She may take tylenol for pain. Her calcium level is also elevated. I am going to order repeat BMP for her to have drawn in one month. She will need to present for lab visit at that time to have her blood drawn. Her blood count has improved.

## 2024-10-03 ENCOUNTER — TELEPHONE (OUTPATIENT)
Dept: FAMILY MEDICINE | Facility: CLINIC | Age: 69
End: 2024-10-03
Payer: COMMERCIAL

## 2024-10-03 NOTE — TELEPHONE ENCOUNTER
----- Message from Mireya Barnes MD sent at 10/2/2024  2:55 PM CDT -----  Please let patient know that she does have arthritis in her right hip. If she is interested in physical therapy, please let me know.

## 2024-10-03 NOTE — TELEPHONE ENCOUNTER
----- Message from Mireya Barnes MD sent at 10/1/2024 11:37 AM CDT -----  Please let patient know that her kidney function has worsened. She will need to stop all NSAIDs (BC powder, ibuprofen, advil, naproxen, etc). She may take tylenol for pain. Her calcium level is also elevated. I am going to order repeat BMP for her to   have drawn in one month. She will need to present for lab visit at that time to have her blood drawn. Her blood count has improved.

## 2025-01-28 DIAGNOSIS — E55.9 VITAMIN D DEFICIENCY: ICD-10-CM

## 2025-01-28 DIAGNOSIS — K21.9 GASTROESOPHAGEAL REFLUX DISEASE, UNSPECIFIED WHETHER ESOPHAGITIS PRESENT: ICD-10-CM

## 2025-01-28 DIAGNOSIS — F41.9 ANXIETY: ICD-10-CM

## 2025-01-28 DIAGNOSIS — I10 PRIMARY HYPERTENSION: ICD-10-CM

## 2025-01-28 DIAGNOSIS — Z96.642 S/P TOTAL LEFT HIP ARTHROPLASTY: Primary | ICD-10-CM

## 2025-01-28 RX ORDER — PANTOPRAZOLE SODIUM 40 MG/1
40 TABLET, DELAYED RELEASE ORAL DAILY
Qty: 90 TABLET | Refills: 0 | Status: SHIPPED | OUTPATIENT
Start: 2025-01-28 | End: 2026-01-28

## 2025-01-28 RX ORDER — TRAZODONE HYDROCHLORIDE 50 MG/1
50 TABLET ORAL NIGHTLY
Qty: 90 TABLET | Refills: 0 | Status: SHIPPED | OUTPATIENT
Start: 2025-01-28

## 2025-01-28 RX ORDER — CHOLECALCIFEROL (VITAMIN D3) 25 MCG
1000 TABLET ORAL DAILY
Qty: 90 TABLET | Refills: 0 | Status: SHIPPED | OUTPATIENT
Start: 2025-01-28 | End: 2026-01-28

## 2025-01-28 RX ORDER — POTASSIUM CHLORIDE 750 MG/1
10 TABLET, EXTENDED RELEASE ORAL DAILY
Qty: 90 TABLET | Refills: 0 | Status: SHIPPED | OUTPATIENT
Start: 2025-01-28

## 2025-01-28 RX ORDER — ATENOLOL AND CHLORTHALIDONE TABLET 50; 25 MG/1; MG/1
1 TABLET ORAL DAILY
Qty: 90 TABLET | Refills: 0 | Status: SHIPPED | OUTPATIENT
Start: 2025-01-28

## 2025-01-28 RX ORDER — TOPIRAMATE 25 MG/1
25 TABLET ORAL 2 TIMES DAILY
Qty: 180 TABLET | Refills: 0 | Status: SHIPPED | OUTPATIENT
Start: 2025-01-28

## 2025-01-28 RX ORDER — FLUOXETINE HYDROCHLORIDE 20 MG/1
20 CAPSULE ORAL 2 TIMES DAILY
Qty: 180 CAPSULE | Refills: 0 | Status: SHIPPED | OUTPATIENT
Start: 2025-01-28

## 2025-03-06 ENCOUNTER — TELEPHONE (OUTPATIENT)
Dept: ORTHOPEDICS | Facility: CLINIC | Age: 70
End: 2025-03-06
Payer: COMMERCIAL

## 2025-04-07 ENCOUNTER — TELEPHONE (OUTPATIENT)
Dept: FAMILY MEDICINE | Facility: CLINIC | Age: 70
End: 2025-04-07
Payer: COMMERCIAL

## 2025-04-07 DIAGNOSIS — Z12.11 SCREENING FOR MALIGNANT NEOPLASM OF COLON: Primary | ICD-10-CM

## 2025-04-07 NOTE — TELEPHONE ENCOUNTER
Contacted pt to discuss care gaps that are due. Pt is amendable to having colonoscopy schedule but would like to have scheduled in [place] with Dr. Malloy. Pt refused DEXA at this time due to transportation issues getting to [place]. Verbalized understanding.

## 2025-05-13 ENCOUNTER — OFFICE VISIT (OUTPATIENT)
Dept: FAMILY MEDICINE | Facility: CLINIC | Age: 70
End: 2025-05-13
Payer: COMMERCIAL

## 2025-05-13 VITALS
TEMPERATURE: 98 F | RESPIRATION RATE: 16 BRPM | HEART RATE: 60 BPM | DIASTOLIC BLOOD PRESSURE: 82 MMHG | BODY MASS INDEX: 32.86 KG/M2 | HEIGHT: 60 IN | OXYGEN SATURATION: 99 % | SYSTOLIC BLOOD PRESSURE: 138 MMHG | WEIGHT: 167.38 LBS

## 2025-05-13 DIAGNOSIS — R69 TAKING MULTIPLE MEDICATIONS FOR CHRONIC DISEASE: ICD-10-CM

## 2025-05-13 DIAGNOSIS — Z78.0 ENCOUNTER FOR OSTEOPOROSIS SCREENING IN ASYMPTOMATIC POSTMENOPAUSAL PATIENT: ICD-10-CM

## 2025-05-13 DIAGNOSIS — Z12.31 SCREENING MAMMOGRAM FOR BREAST CANCER: ICD-10-CM

## 2025-05-13 DIAGNOSIS — Z11.59 ENCOUNTER FOR HEPATITIS C SCREENING TEST FOR LOW RISK PATIENT: ICD-10-CM

## 2025-05-13 DIAGNOSIS — Z13.820 ENCOUNTER FOR OSTEOPOROSIS SCREENING IN ASYMPTOMATIC POSTMENOPAUSAL PATIENT: ICD-10-CM

## 2025-05-13 DIAGNOSIS — E66.811 OBESITY (BMI 30.0-34.9): ICD-10-CM

## 2025-05-13 DIAGNOSIS — Z00.00 ENCOUNTER FOR SUBSEQUENT ANNUAL WELLNESS VISIT (AWV) IN MEDICARE PATIENT: Primary | ICD-10-CM

## 2025-05-13 DIAGNOSIS — I10 PRIMARY HYPERTENSION: ICD-10-CM

## 2025-05-13 DIAGNOSIS — Z12.11 COLON CANCER SCREENING: ICD-10-CM

## 2025-05-13 DIAGNOSIS — F41.9 ANXIETY: ICD-10-CM

## 2025-05-13 DIAGNOSIS — R73.9 ELEVATED BLOOD SUGAR: ICD-10-CM

## 2025-05-13 LAB
ANION GAP SERPL CALCULATED.3IONS-SCNC: 11 MMOL/L (ref 7–16)
BUN SERPL-MCNC: 25 MG/DL (ref 10–20)
BUN/CREAT SERPL: 25 (ref 6–20)
CALCIUM SERPL-MCNC: 10.3 MG/DL (ref 8.4–10.2)
CHLORIDE SERPL-SCNC: 107 MMOL/L (ref 98–107)
CHOLEST SERPL-MCNC: 230 MG/DL
CHOLEST/HDLC SERPL: 2.8 {RATIO}
CO2 SERPL-SCNC: 23 MMOL/L (ref 23–31)
CREAT SERPL-MCNC: 1.02 MG/DL (ref 0.55–1.02)
EGFR (NO RACE VARIABLE) (RUSH/TITUS): 59 ML/MIN/1.73M2
EST. AVERAGE GLUCOSE BLD GHB EST-MCNC: 82 MG/DL
GLUCOSE SERPL-MCNC: 87 MG/DL (ref 82–115)
HBA1C MFR BLD HPLC: 4.5 %
HCV AB SER QL: NORMAL
HDLC SERPL-MCNC: 82 MG/DL (ref 35–60)
LDLC SERPL CALC-MCNC: 130 MG/DL
LDLC/HDLC SERPL: 1.6 {RATIO}
NONHDLC SERPL-MCNC: 148 MG/DL
POTASSIUM SERPL-SCNC: 4 MMOL/L (ref 3.5–5.1)
SODIUM SERPL-SCNC: 137 MMOL/L (ref 136–145)
TRIGL SERPL-MCNC: 88 MG/DL (ref 37–140)
VLDLC SERPL-MCNC: 18 MG/DL

## 2025-05-13 PROCEDURE — 1100F PTFALLS ASSESS-DOCD GE2>/YR: CPT | Mod: ,,, | Performed by: STUDENT IN AN ORGANIZED HEALTH CARE EDUCATION/TRAINING PROGRAM

## 2025-05-13 PROCEDURE — 1125F AMNT PAIN NOTED PAIN PRSNT: CPT | Mod: ,,, | Performed by: STUDENT IN AN ORGANIZED HEALTH CARE EDUCATION/TRAINING PROGRAM

## 2025-05-13 PROCEDURE — 1170F FXNL STATUS ASSESSED: CPT | Mod: ,,, | Performed by: STUDENT IN AN ORGANIZED HEALTH CARE EDUCATION/TRAINING PROGRAM

## 2025-05-13 PROCEDURE — 3075F SYST BP GE 130 - 139MM HG: CPT | Mod: ,,, | Performed by: STUDENT IN AN ORGANIZED HEALTH CARE EDUCATION/TRAINING PROGRAM

## 2025-05-13 PROCEDURE — G0439 PPPS, SUBSEQ VISIT: HCPCS | Mod: ,,, | Performed by: STUDENT IN AN ORGANIZED HEALTH CARE EDUCATION/TRAINING PROGRAM

## 2025-05-13 PROCEDURE — 3288F FALL RISK ASSESSMENT DOCD: CPT | Mod: ,,, | Performed by: STUDENT IN AN ORGANIZED HEALTH CARE EDUCATION/TRAINING PROGRAM

## 2025-05-13 PROCEDURE — 86803 HEPATITIS C AB TEST: CPT | Mod: ,,, | Performed by: CLINICAL MEDICAL LABORATORY

## 2025-05-13 PROCEDURE — 1159F MED LIST DOCD IN RCRD: CPT | Mod: ,,, | Performed by: STUDENT IN AN ORGANIZED HEALTH CARE EDUCATION/TRAINING PROGRAM

## 2025-05-13 PROCEDURE — 80061 LIPID PANEL: CPT | Mod: ,,, | Performed by: CLINICAL MEDICAL LABORATORY

## 2025-05-13 PROCEDURE — 80048 BASIC METABOLIC PNL TOTAL CA: CPT | Mod: ,,, | Performed by: CLINICAL MEDICAL LABORATORY

## 2025-05-13 PROCEDURE — 3079F DIAST BP 80-89 MM HG: CPT | Mod: ,,, | Performed by: STUDENT IN AN ORGANIZED HEALTH CARE EDUCATION/TRAINING PROGRAM

## 2025-05-13 PROCEDURE — 1158F ADVNC CARE PLAN TLK DOCD: CPT | Mod: ,,, | Performed by: STUDENT IN AN ORGANIZED HEALTH CARE EDUCATION/TRAINING PROGRAM

## 2025-05-13 PROCEDURE — 83036 HEMOGLOBIN GLYCOSYLATED A1C: CPT | Mod: ,,, | Performed by: CLINICAL MEDICAL LABORATORY

## 2025-05-13 RX ORDER — TRAZODONE HYDROCHLORIDE 50 MG/1
50 TABLET ORAL NIGHTLY
Qty: 90 TABLET | Refills: 0 | Status: SHIPPED | OUTPATIENT
Start: 2025-05-13

## 2025-05-13 NOTE — PROGRESS NOTES
Radha Waldrop presented for a follow-up Medicare AWV today. The following components were reviewed and updated:    Medical history  Family History  Social history  Allergies and Current Medications  Health Risk Assessment  Health Maintenance  Care Team    **See Completed Assessments for Annual Wellness visit with in the encounter summary    The following assessments were completed:  Depression Screening  Cognitive function Screening  Timed Get Up Test  Whisper Test      Opioid documentation:      Patient does not have a current opioid prescription.          Vitals:    05/13/25 1014   BP: 138/82   BP Location: Left arm   Patient Position: Sitting   Pulse: 60   Resp: 16   Temp: 98.4 °F (36.9 °C)   SpO2: 99%   Weight: 75.9 kg (167 lb 6.4 oz)   Height: 5' (1.524 m)     Body mass index is 32.69 kg/m².       Physical Exam  Constitutional:       Appearance: Normal appearance.   HENT:      Head: Normocephalic and atraumatic.      Nose: No congestion or rhinorrhea.   Eyes:      Extraocular Movements: Extraocular movements intact.      Pupils: Pupils are equal, round, and reactive to light.   Cardiovascular:      Rate and Rhythm: Normal rate and regular rhythm.      Heart sounds: No murmur heard.     No friction rub. No gallop.   Pulmonary:      Effort: Pulmonary effort is normal. No respiratory distress.      Breath sounds: Normal breath sounds. No stridor. No wheezing, rhonchi or rales.   Musculoskeletal:         General: No swelling. Normal range of motion.   Skin:     General: Skin is warm.      Capillary Refill: Capillary refill takes less than 2 seconds.   Neurological:      General: No focal deficit present.      Mental Status: She is alert.   Psychiatric:         Mood and Affect: Mood normal.         Behavior: Behavior normal.         Diagnoses and health risks identified today and associated recommendations/orders:  1. Encounter for subsequent annual wellness visit (AWV) in Medicare patient  Patient doing well.   Routine screenings ordered.    2. Primary hypertension  Currently well-controlled.  Patient to continue current regimen.  BMP and lipid panel ordered.  - Lipid Panel; Future  - Basic Metabolic Panel  - Lipid Panel    3. Anxiety  Well-controlled with trazodone.  Patient requesting refill.  Refill provided.  - traZODone (DESYREL) 50 MG tablet; Take 1 tablet (50 mg total) by mouth every evening.  Dispense: 90 tablet; Refill: 0    4. Encounter for hepatitis C screening test for low risk patient  Patient due for Hepatitis-C screening.  Patient amenable. Hepatitis-C screening ordered.    - Hepatitis C Antibody; Future  - Hepatitis C Antibody    5. Taking multiple medications for chronic disease      6. Encounter for osteoporosis screening in asymptomatic postmenopausal patient  DEXA scan ordered.  - DXA Bone Density Axial Skeleton 1 or more sites; Future    7. Screening mammogram for breast cancer  Mammogram ordered.  - Mammo Digital Screening Bilat w/ Zachary (XPD); Future    8. Elevated blood sugar  Obtaining hemoglobin A1c given elevated blood glucose levels previously.  - Hemoglobin A1C; Future  - Hemoglobin A1C    9. Obesity (BMI 30.0-34.9)  Continue to encourage diet and lifestyle modification.    Health Maintenance Due   Topic Date Due    Hepatitis C Screening  Never done    TETANUS VACCINE  Never done    Hemoglobin A1c (Diabetic Prevention Screening)  Never done    DEXA Scan  Never done    Colorectal Cancer Screening  Never done    Shingles Vaccine (1 of 2) Never done    Pneumococcal Vaccines (Age 50+) (1 of 1 - PCV) Never done    RSV Vaccine (Age 60+ and Pregnant patients) (1 - Risk 60-74 years 1-dose series) Never done    COVID-19 Vaccine (4 - 2024-25 season) 09/01/2024    Lipid Panel  01/09/2025    Mammogram  07/18/2025      Provided Radha with a 5-10 year written screening schedule and personal prevention plan. Recommendations were developed using the USPSTF age appropriate recommendations. Education,  counseling, and referrals were provided as needed.  After Visit Summary printed and given to patient which includes a list of additional screenings\tests needed.    Patient declines vaccines today , will followup with pharmacy at later date if desired.     LIPID, A1C, HEP C Screening ordered today     Dexa/ Bone Density Screening & Mammogram ordered to be done at [facility] after 07/19/2025    Colonoscopy ordered to be done at [facility]     Follow up for 1 year annual wellness.      KRISTIN ALBA MD      I offered to discuss advanced care planning, including how to pick a person who would make decisions for you if you were unable to make them for yourself, called a health care power of , and what kind of decisions you might make such as use of life sustaining treatments such as ventilators and tube feeding when faced with a life limiting illness recorded on a living will that they will need to know. (How you want to be cared for as you near the end of your natural life)     X Patient is interested in learning more about how to make advanced directives.  I provided them paperwork and offered to discuss this with them.

## 2025-05-13 NOTE — PATIENT INSTRUCTIONS
Counseling and Referral of Other Preventative  (Italic type indicates deductible and co-insurance are waived)    Patient Name: Radha Waldrop  Today's Date: 5/13/2025    Health Maintenance       Date Due Completion Date    Hepatitis C Screening Never done ---    TETANUS VACCINE Never done ---    Hemoglobin A1c (Diabetic Prevention Screening) Never done ---    DEXA Scan Never done ---    Colorectal Cancer Screening Never done ---    Shingles Vaccine (1 of 2) Never done ---    Pneumococcal Vaccines (Age 50+) (1 of 1 - PCV) Never done ---    RSV Vaccine (Age 60+ and Pregnant patients) (1 - Risk 60-74 years 1-dose series) Never done ---    COVID-19 Vaccine (4 - 2024-25 season) 09/01/2024 3/24/2022    Lipid Panel 01/09/2025 1/9/2024    Mammogram 07/18/2025 7/18/2024    Influenza Vaccine (Season Ended) 09/01/2025 ---        No orders of the defined types were placed in this encounter.    The following information is provided to all patients.  This information is to help you find resources for any of the problems found today that may be affecting your health:                  Living healthy guide: [place].gov    Understanding Diabetes: www.diabetes.org      Eating healthy: www.cdc.gov/healthyweight      CDC home safety checklist: www.cdc.gov/steadi/patient.html      Agency on Aging: [place].gov    Alcoholics anonymous (AA): www.aa.org      Physical Activity: www.pito.nih.gov/dv3cbsi      Tobacco use: [place].gov

## 2025-05-14 ENCOUNTER — RESULTS FOLLOW-UP (OUTPATIENT)
Dept: FAMILY MEDICINE | Facility: CLINIC | Age: 70
End: 2025-05-14

## 2025-05-14 NOTE — PROGRESS NOTES
Please let patient know that her kidney function is normal.  Her calcium level is elevated.  We will need to recheck this level at her follow up appointment.  If it is still elevated at that time, we will need to perform additional evaluation.  Her cholesterol is elevated.  She would likely benefit from starting a cholesterol medication.  If she would like to try a low-cholesterol diet instead we can recheck in 6 months and then decide whether or not she needs medication.  She is negative for hepatitis-C.  Her hemoglobin A1c is normal.

## 2025-07-21 ENCOUNTER — HOSPITAL ENCOUNTER (OUTPATIENT)
Dept: RADIOLOGY | Facility: HOSPITAL | Age: 70
Discharge: HOME | End: 2025-07-21
Attending: STUDENT IN AN ORGANIZED HEALTH CARE EDUCATION/TRAINING PROGRAM
Payer: COMMERCIAL

## 2025-07-21 VITALS — BODY MASS INDEX: 34.36 KG/M2 | WEIGHT: 175 LBS | HEIGHT: 60 IN

## 2025-07-21 DIAGNOSIS — Z13.820 ENCOUNTER FOR OSTEOPOROSIS SCREENING IN ASYMPTOMATIC POSTMENOPAUSAL PATIENT: ICD-10-CM

## 2025-07-21 DIAGNOSIS — Z12.31 SCREENING MAMMOGRAM FOR BREAST CANCER: ICD-10-CM

## 2025-07-21 DIAGNOSIS — Z78.0 ENCOUNTER FOR OSTEOPOROSIS SCREENING IN ASYMPTOMATIC POSTMENOPAUSAL PATIENT: ICD-10-CM

## 2025-07-21 PROCEDURE — 77067 SCR MAMMO BI INCL CAD: CPT | Mod: TC

## 2025-07-21 PROCEDURE — 77080 DXA BONE DENSITY AXIAL: CPT | Mod: 26,,, | Performed by: NUCLEAR MEDICINE

## 2025-07-21 PROCEDURE — 77080 DXA BONE DENSITY AXIAL: CPT | Mod: TC

## 2025-07-25 ENCOUNTER — OFFICE VISIT (OUTPATIENT)
Dept: FAMILY MEDICINE | Facility: CLINIC | Age: 70
End: 2025-07-25
Payer: COMMERCIAL

## 2025-07-25 VITALS
OXYGEN SATURATION: 98 % | TEMPERATURE: 98 F | BODY MASS INDEX: 33.06 KG/M2 | DIASTOLIC BLOOD PRESSURE: 82 MMHG | RESPIRATION RATE: 18 BRPM | HEART RATE: 60 BPM | SYSTOLIC BLOOD PRESSURE: 130 MMHG | WEIGHT: 168.38 LBS | HEIGHT: 60 IN

## 2025-07-25 DIAGNOSIS — G43.009 MIGRAINE WITHOUT AURA AND WITHOUT STATUS MIGRAINOSUS, NOT INTRACTABLE: Primary | ICD-10-CM

## 2025-07-25 DIAGNOSIS — K21.9 GASTROESOPHAGEAL REFLUX DISEASE, UNSPECIFIED WHETHER ESOPHAGITIS PRESENT: ICD-10-CM

## 2025-07-25 DIAGNOSIS — E55.9 VITAMIN D DEFICIENCY: ICD-10-CM

## 2025-07-25 DIAGNOSIS — F41.9 ANXIETY: ICD-10-CM

## 2025-07-25 DIAGNOSIS — I10 PRIMARY HYPERTENSION: ICD-10-CM

## 2025-07-25 LAB
25(OH)D3 SERPL-MCNC: 39.8 NG/ML (ref 30–80)
ALBUMIN SERPL BCP-MCNC: 3.6 G/DL (ref 3.4–4.8)
ALBUMIN/GLOB SERPL: 0.9 {RATIO}
ALP SERPL-CCNC: 50 U/L (ref 40–150)
ALT SERPL W P-5'-P-CCNC: 7 U/L
ANION GAP SERPL CALCULATED.3IONS-SCNC: 10 MMOL/L (ref 7–16)
ANISOCYTOSIS BLD QL SMEAR: NORMAL
AST SERPL W P-5'-P-CCNC: 50 U/L (ref 11–45)
BASOPHILS # BLD AUTO: 0.02 K/UL (ref 0–0.2)
BASOPHILS NFR BLD AUTO: 0.3 % (ref 0–1)
BILIRUB SERPL-MCNC: 0.8 MG/DL
BUN SERPL-MCNC: 23 MG/DL (ref 10–20)
BUN/CREAT SERPL: 19 (ref 6–20)
CALCIUM SERPL-MCNC: 9.9 MG/DL (ref 8.4–10.2)
CHLORIDE SERPL-SCNC: 108 MMOL/L (ref 98–107)
CO2 SERPL-SCNC: 26 MMOL/L (ref 23–31)
CREAT SERPL-MCNC: 1.21 MG/DL (ref 0.55–1.02)
CRENATED CELLS: NORMAL
DIFFERENTIAL METHOD BLD: ABNORMAL
EGFR (NO RACE VARIABLE) (RUSH/TITUS): 48 ML/MIN/1.73M2
EOSINOPHIL # BLD AUTO: 0.1 K/UL (ref 0–0.5)
EOSINOPHIL NFR BLD AUTO: 1.7 % (ref 1–4)
ERYTHROCYTE [DISTWIDTH] IN BLOOD BY AUTOMATED COUNT: 12 % (ref 11.5–14.5)
GLOBULIN SER-MCNC: 4 G/DL (ref 2–4)
GLUCOSE SERPL-MCNC: 82 MG/DL (ref 82–115)
HCT VFR BLD AUTO: 37.1 % (ref 38–47)
HGB BLD-MCNC: 11.7 G/DL (ref 12–16)
IMM GRANULOCYTES # BLD AUTO: 0.01 K/UL (ref 0–0.04)
IMM GRANULOCYTES NFR BLD: 0.2 % (ref 0–0.4)
LYMPHOCYTES # BLD AUTO: 1.51 K/UL (ref 1–4.8)
LYMPHOCYTES NFR BLD AUTO: 26.2 % (ref 27–41)
MACROCYTES BLD QL SMEAR: NORMAL
MCH RBC QN AUTO: 33.8 PG (ref 27–31)
MCHC RBC AUTO-ENTMCNC: 31.5 G/DL (ref 32–36)
MCV RBC AUTO: 107.2 FL (ref 80–96)
MONOCYTES # BLD AUTO: 0.5 K/UL (ref 0–0.8)
MONOCYTES NFR BLD AUTO: 8.7 % (ref 2–6)
MPC BLD CALC-MCNC: 10.4 FL (ref 9.4–12.4)
NEUTROPHILS # BLD AUTO: 3.63 K/UL (ref 1.8–7.7)
NEUTROPHILS NFR BLD AUTO: 62.9 % (ref 53–65)
NRBC # BLD AUTO: 0 X10E3/UL
NRBC, AUTO (.00): 0 %
PLATELET # BLD AUTO: 296 K/UL (ref 150–400)
PLATELET MORPHOLOGY: NORMAL
POTASSIUM SERPL-SCNC: 3.9 MMOL/L (ref 3.5–5.1)
PROT SERPL-MCNC: 7.6 G/DL (ref 5.8–7.6)
RBC # BLD AUTO: 3.46 M/UL (ref 4.2–5.4)
SODIUM SERPL-SCNC: 140 MMOL/L (ref 136–145)
WBC # BLD AUTO: 5.77 K/UL (ref 4.5–11)

## 2025-07-25 RX ORDER — CHOLECALCIFEROL (VITAMIN D3) 25 MCG
1000 TABLET ORAL DAILY
Qty: 90 TABLET | Refills: 1 | Status: SHIPPED | OUTPATIENT
Start: 2025-07-25 | End: 2026-07-25

## 2025-07-25 RX ORDER — POTASSIUM CHLORIDE 750 MG/1
10 TABLET, EXTENDED RELEASE ORAL DAILY
Qty: 90 TABLET | Refills: 1 | Status: SHIPPED | OUTPATIENT
Start: 2025-07-25

## 2025-07-25 RX ORDER — ATENOLOL AND CHLORTHALIDONE TABLET 50; 25 MG/1; MG/1
1 TABLET ORAL DAILY
Qty: 90 TABLET | Refills: 1 | Status: SHIPPED | OUTPATIENT
Start: 2025-07-25

## 2025-07-25 RX ORDER — TRAZODONE HYDROCHLORIDE 50 MG/1
50 TABLET ORAL NIGHTLY
Qty: 90 TABLET | Refills: 1 | Status: SHIPPED | OUTPATIENT
Start: 2025-07-25

## 2025-07-25 RX ORDER — FLUOXETINE 20 MG/1
20 CAPSULE ORAL 2 TIMES DAILY
Qty: 180 CAPSULE | Refills: 1 | Status: SHIPPED | OUTPATIENT
Start: 2025-07-25

## 2025-07-25 RX ORDER — PANTOPRAZOLE SODIUM 40 MG/1
40 TABLET, DELAYED RELEASE ORAL DAILY
Qty: 90 TABLET | Refills: 1 | Status: SHIPPED | OUTPATIENT
Start: 2025-07-25 | End: 2026-07-25

## 2025-07-25 RX ORDER — TOPIRAMATE 25 MG/1
25 TABLET, FILM COATED ORAL 2 TIMES DAILY
Qty: 180 TABLET | Refills: 1 | Status: SHIPPED | OUTPATIENT
Start: 2025-07-25

## 2025-07-25 NOTE — PROGRESS NOTES
Progress Note     KRISTIN ALBA MD   [address]  [place] [address]     PATIENT NAME: Radha Waldrop  : 1955  DATE: 25  MRN: [id number]      Billing Provider: KRISTIN ALBA MD  Level of Service:   Patient PCP Information       Provider PCP Type    KRISTIN ALBA MD General                Hypertension (Patient is here for  f/u for medication refill.) and Health Maintenance (TETANUS VACCINE Never done/Colorectal Cancer Screening Never done /Shingles Vaccine(1 of 2) Never done/Pneumococcal Vaccines (Age 50+)(1 of 1 - PCV) Never done/RSV Vaccine (Age 60+ and Pregnant patients)(1 - Risk 60-74 years 1-dose series) Never done/COVID-19 Vaccine( season) due on /Colorectal Cancer Screening  is scheduled for 2025/Patient has had Mammogram need to request records/Patient declined all Vaccines at this time.)      SUBJECTIVE:     Radha Waldrop is a 70 y.o.female who presents to clinic for Hypertension (Patient is here for  f/u for medication refill.) and Health Maintenance (TETANUS VACCINE Never done/Colorectal Cancer Screening Never done /Shingles Vaccine(1 of 2) Never done/Pneumococcal Vaccines (Age 50+)(1 of 1 - PCV) Never done/RSV Vaccine (Age 60+ and Pregnant patients)(1 - Risk 60-74 years 1-dose series) Never done/COVID-19 Vaccine( season) due on /Colorectal Cancer Screening  is scheduled for 2025/Patient has had Mammogram need to request records/Patient declined all Vaccines at this time.)    Patient presents to clinic today for routine follow up.  Patient has a history of anxiety for which she takes Prozac and trazodone.  Symptoms are currently well-controlled.      Patient has a history of migraines for which she takes Topamax.  She states that she rarely has a migraine and that her medication is effective.    Patient has a history of hypertension for which he takes atenolol/chlorthalidone with  potassium supplementation.  Blood pressure is currently well-controlled.  She is tolerating the medication without difficulty.      Patient has a history of vitamin-D deficiency for which she takes vitamin-D daily.    Patient has GERD is currently well-controlled with Protonix.    Patient has longstanding anemia and has been referred to GI for colonoscopy.  Patient states that she is scheduled for paperwork to be completed August 5th.    Patient had mammogram at [facility] last week.  Results are not yet available.    Had PNA shot in past. Will get Tdap and Shingles from pharmacy.     All other pertinent review of systems negative. Please see HPI for details.     Past Medical History:  has a past medical history of Arthritis, Essential (primary) hypertension, Generalized anxiety disorder, Insomnia, and Migraines.   Past Surgical History:  has a past surgical history that includes Knee surgery (Right); knee replacement right (Right); robotic arthroplasty, hip (Left, 03/14/2024); and Thyroid surgery.  Family History: family history includes Diabetes in her mother; Lung cancer in her father.  Social History:  reports that she has never smoked. She has never been exposed to tobacco smoke. She has never used smokeless tobacco. She reports that she does not drink alcohol and does not use drugs.  Allergies:   Review of patient's allergies indicates:   Allergen Reactions    Ace inhibitors Swelling       Current Medications[1]   OBJECTIVE:     Vital Signs   /82 (BP Location: Left arm, Patient Position: Sitting)   Pulse 60   Temp 98.4 °F (36.9 °C) (Oral)   Resp 18   Ht 5' (1.524 m)   Wt 76.4 kg (168 lb 6.4 oz)   SpO2 98%   BMI 32.89 kg/m²     Physical Exam  Constitutional:       General: She is not in acute distress.     Appearance: Normal appearance. She is not ill-appearing, toxic-appearing or diaphoretic.   HENT:      Head: Normocephalic and atraumatic.      Mouth/Throat:      Mouth: Mucous membranes are moist.    Eyes:      Extraocular Movements: Extraocular movements intact.      Pupils: Pupils are equal, round, and reactive to light.   Cardiovascular:      Rate and Rhythm: Normal rate and regular rhythm.      Pulses: Normal pulses.      Heart sounds: Normal heart sounds. No murmur heard.     No friction rub. No gallop.   Pulmonary:      Effort: Pulmonary effort is normal. No respiratory distress.      Breath sounds: No wheezing, rhonchi or rales.   Abdominal:      General: Abdomen is flat.      Palpations: Abdomen is soft.      Tenderness: There is no abdominal tenderness. There is no guarding or rebound.   Musculoskeletal:         General: Normal range of motion.      Cervical back: Normal range of motion.   Skin:     General: Skin is warm and dry.      Capillary Refill: Capillary refill takes less than 2 seconds.   Neurological:      General: No focal deficit present.      Mental Status: She is alert.   Psychiatric:         Mood and Affect: Mood normal.         Behavior: Behavior normal.         ASSESSMENT/PLAN:     1. Migraine without aura and without status migrainosus, not intractable  Overview:       Orders:  -     topiramate (TOPAMAX) 25 MG tablet; Take 1 tablet (25 mg total) by mouth 2 (two) times daily.  Dispense: 180 tablet; Refill: 1  Well-controlled with daily Topamax.  Refill provided.  Patient may continue.    2. Primary hypertension  -     atenoloL-chlorthalidone (TENORETIC) 50-25 mg Tab; Take 1 tablet by mouth once daily.  Dispense: 90 tablet; Refill: 1  -     potassium chloride (KLOR-CON) 10 MEQ TbSR; Take 1 tablet (10 mEq total) by mouth once daily.  Dispense: 90 tablet; Refill: 1  -     Comprehensive Metabolic Panel; Future; Expected date: 07/25/2025  -     CBC Auto Differential; Future; Expected date: 07/25/2025  We will pressure currently well-controlled.  Patient due for blood work.  Refill provided.  Blood work ordered.    3. Gastroesophageal reflux disease, unspecified whether esophagitis  present  -     pantoprazole (PROTONIX) 40 MG tablet; Take 1 tablet (40 mg total) by mouth once daily.  Dispense: 90 tablet; Refill: 1  Well-controlled with Protonix.  Refill provided.      4. Anxiety  -     traZODone (DESYREL) 50 MG tablet; Take 1 tablet (50 mg total) by mouth every evening.  Dispense: 90 tablet; Refill: 1  -     FLUoxetine 20 MG capsule; Take 1 capsule (20 mg total) by mouth 2 (two) times daily.  Dispense: 180 capsule; Refill: 1  Well-controlled with trazodone and Prozac.  Patient to continue.  Refills provided.      5. Vitamin D deficiency  -     vitamin D (VITAMIN D3) 1000 units Tab; Take 1 tablet (1,000 Units total) by mouth once daily.  Dispense: 90 tablet; Refill: 1  -     Vitamin D; Future; Expected date: 07/25/2025  Patient due for vitamin-D level.  Vitamin-D ordered.      Follow up in about 6 months (around 1/25/2026).      KRISTIN ALBA MD  07/25/2025    Due to voice recognition software, sound alike and misspelled words may be contained in the documentation.              [1]   Current Outpatient Medications:     atenoloL-chlorthalidone (TENORETIC) 50-25 mg Tab, Take 1 tablet by mouth once daily., Disp: 90 tablet, Rfl: 1    FLUoxetine 20 MG capsule, Take 1 capsule (20 mg total) by mouth 2 (two) times daily., Disp: 180 capsule, Rfl: 1    pantoprazole (PROTONIX) 40 MG tablet, Take 1 tablet (40 mg total) by mouth once daily., Disp: 90 tablet, Rfl: 1    potassium chloride (KLOR-CON) 10 MEQ TbSR, Take 1 tablet (10 mEq total) by mouth once daily., Disp: 90 tablet, Rfl: 1    topiramate (TOPAMAX) 25 MG tablet, Take 1 tablet (25 mg total) by mouth 2 (two) times daily., Disp: 180 tablet, Rfl: 1    traZODone (DESYREL) 50 MG tablet, Take 1 tablet (50 mg total) by mouth every evening., Disp: 90 tablet, Rfl: 1    vitamin D (VITAMIN D3) 1000 units Tab, Take 1 tablet (1,000 Units total) by mouth once daily., Disp: 90 tablet, Rfl: 1

## 2025-07-31 ENCOUNTER — EXTERNAL CHRONIC CARE MANAGEMENT (OUTPATIENT)
Dept: FAMILY MEDICINE | Facility: CLINIC | Age: 70
End: 2025-07-31
Payer: COMMERCIAL

## 2025-07-31 PROCEDURE — 99490 CHRNC CARE MGMT STAFF 1ST 20: CPT | Mod: ,,, | Performed by: STUDENT IN AN ORGANIZED HEALTH CARE EDUCATION/TRAINING PROGRAM

## 2025-07-31 PROCEDURE — 99439 CHRNC CARE MGMT STAF EA ADDL: CPT | Mod: ,,, | Performed by: STUDENT IN AN ORGANIZED HEALTH CARE EDUCATION/TRAINING PROGRAM

## (undated) DEVICE — SOL NACL IRR 1000ML BTL

## (undated) DEVICE — DRESSING AQUACEL FOAM RECT 6X6

## (undated) DEVICE — KIT CHECKPOINT TIBIAL

## (undated) DEVICE — CHECKPOINT IMPACT 3.5MM HEX ST

## (undated) DEVICE — PIN BONE 4 X 140MM STERILE
Type: IMPLANTABLE DEVICE | Site: FEMUR | Status: NON-FUNCTIONAL
Removed: 2024-03-14

## (undated) DEVICE — POUCH INSTRUMENT 2 POCKET

## (undated) DEVICE — REAMER MOD BIXCUT 8X48MM STER.

## (undated) DEVICE — DRAPE INCISE IOBAN 2 13X13IN

## (undated) DEVICE — GLOVE 8 PROTEXIS PI BLUE

## (undated) DEVICE — SUT VICRYL 2-0 36 CT-1

## (undated) DEVICE — SUT VICRYL CTD 1 27IN CP

## (undated) DEVICE — GLOVE 7.5 PROTEXIS PI BLUE

## (undated) DEVICE — DRILL BIT 4X25MM

## (undated) DEVICE — GLOVE BIOGEL SKINSENSE PI 7.5

## (undated) DEVICE — TUBE SUCTION KAMVAC MINI 20/BX

## (undated) DEVICE — Device

## (undated) DEVICE — BLADE SURG #15 CARBON STEEL

## (undated) DEVICE — STAPLER SKIN WIDE

## (undated) DEVICE — APPLICATOR CHLORAPREP ORN 26ML

## (undated) DEVICE — BIT DRILL 4.2MM X 130MM

## (undated) DEVICE — KIT TRACKING VIZADISC HIP

## (undated) DEVICE — KIT DRAPE RIO ONE PIECE W/POCK

## (undated) DEVICE — GUIDE WIRE 3.0X1000MM BALL TIP
Type: IMPLANTABLE DEVICE | Site: FEMUR | Status: NON-FUNCTIONAL
Removed: 2024-03-14

## (undated) DEVICE — GLOVE 6.5 PROTEXIS PI BLUE

## (undated) DEVICE — GLOVE BIOGEL SKINSENSE PI 6.5

## (undated) DEVICE — NEEDL1/2 CIRCLE TROCAR PIINT MAYO CATGUT .056X1.95 STERILE

## (undated) DEVICE — SUT BLU BR 2 TAPERD NDL 1/2

## (undated) DEVICE — SPONGE COTTON TRAY 4X4IN

## (undated) DEVICE — SPACESUIT TOGA T5 ZIPPER PEEL